# Patient Record
Sex: FEMALE | Race: WHITE | Employment: FULL TIME | URBAN - METROPOLITAN AREA
[De-identification: names, ages, dates, MRNs, and addresses within clinical notes are randomized per-mention and may not be internally consistent; named-entity substitution may affect disease eponyms.]

---

## 2019-04-27 PROCEDURE — 88175 CYTOPATH C/V AUTO FLUID REDO: CPT | Performed by: OBSTETRICS & GYNECOLOGY

## 2019-04-27 PROCEDURE — 87624 HPV HI-RISK TYP POOLED RSLT: CPT | Performed by: OBSTETRICS & GYNECOLOGY

## 2019-06-10 ENCOUNTER — OFFICE VISIT (OUTPATIENT)
Dept: SURGERY | Facility: CLINIC | Age: 64
End: 2019-06-10
Payer: COMMERCIAL

## 2019-06-10 VITALS
TEMPERATURE: 99 F | HEIGHT: 65 IN | WEIGHT: 127 LBS | SYSTOLIC BLOOD PRESSURE: 149 MMHG | RESPIRATION RATE: 16 BRPM | BODY MASS INDEX: 21.16 KG/M2 | DIASTOLIC BLOOD PRESSURE: 80 MMHG | HEART RATE: 111 BPM

## 2019-06-10 DIAGNOSIS — Z12.11 ENCOUNTER FOR SCREENING COLONOSCOPY: Primary | ICD-10-CM

## 2019-06-10 PROCEDURE — S0285 CNSLT BEFORE SCREEN COLONOSC: HCPCS | Performed by: COLON & RECTAL SURGERY

## 2019-06-10 NOTE — H&P
New Patient Visit Note       Active Problems      1. Encounter for screening colonoscopy        Chief Complaint   Patient presents with:  Colonoscopy: New pt ref by BRE Byrne for colonoscopy consult. Last c-scope done 2007.        History of Alejandro 30 minutes. Greater than half of our visit was spent in counseling the patient on the above listed diagnoses and treatment options. Allergies  Jacoby Lui has No Known Allergies.     Past Medical / Surgical / Social / Family History    The past medical an and unexpected weight change. HENT: Negative for hearing loss, nosebleeds, sore throat and trouble swallowing. Respiratory: Negative for apnea, cough, shortness of breath and wheezing.     Cardiovascular: Negative for chest pain, palpitations and leg s no abdominal bruit, no ascites, no pulsatile midline mass and no mass. There is no hepatosplenomegaly, splenomegaly or hepatomegaly. There is no tenderness.  There is no rigidity, no rebound, no guarding, no CVA tenderness, no tenderness at McBurney's point outcomes was fully discussed. The patient seemed to understand the conversation and its details. Consent for surgery was confirmed with the patient.     The patient is currently scheduled for colonoscopy at the [facility] on August 22,

## 2019-06-10 NOTE — PATIENT INSTRUCTIONS
Rajendra Sainz is a 61year old female here in consultation for a colonoscopy for colon cancer screening. The patient was referred to my practice by BRE Lawson. The patient has had a previous colonoscopy. It was last done in 2007.     The krystyna requires a colonoscopy for colon cancer screening. All risks, benefits, complications and alternatives to the proposed operation were fully discussed with the patient. All questions from the patient were answered in detail.  A description of the procedu

## 2019-06-19 PROBLEM — Z12.11 ENCOUNTER FOR SCREENING COLONOSCOPY: Status: ACTIVE | Noted: 2019-06-19

## 2019-06-19 RX ORDER — POLYETHYLENE GLYCOL 3350, SODIUM CHLORIDE, SODIUM BICARBONATE, POTASSIUM CHLORIDE 420; 11.2; 5.72; 1.48 G/4L; G/4L; G/4L; G/4L
POWDER, FOR SOLUTION ORAL
Qty: 1 BOTTLE | Refills: 0 | Status: SHIPPED | OUTPATIENT
Start: 2019-06-19 | End: 2019-08-23 | Stop reason: ALTCHOICE

## 2019-08-22 ENCOUNTER — TELEPHONE (OUTPATIENT)
Dept: SURGERY | Facility: CLINIC | Age: 64
End: 2019-08-22

## 2019-08-22 PROCEDURE — 88305 TISSUE EXAM BY PATHOLOGIST: CPT | Performed by: COLON & RECTAL SURGERY

## 2019-08-23 ENCOUNTER — OFFICE VISIT (OUTPATIENT)
Dept: SURGERY | Facility: CLINIC | Age: 64
End: 2019-08-23
Payer: COMMERCIAL

## 2019-08-23 VITALS
SYSTOLIC BLOOD PRESSURE: 126 MMHG | WEIGHT: 125 LBS | HEIGHT: 66 IN | DIASTOLIC BLOOD PRESSURE: 78 MMHG | BODY MASS INDEX: 20.09 KG/M2 | HEART RATE: 98 BPM

## 2019-08-23 DIAGNOSIS — R51.9 CHRONIC INTRACTABLE HEADACHE, UNSPECIFIED HEADACHE TYPE: Primary | ICD-10-CM

## 2019-08-23 DIAGNOSIS — M50.021 CERVICAL DISC DISORDER AT C4-C5 LEVEL WITH MYELOPATHY: ICD-10-CM

## 2019-08-23 DIAGNOSIS — R29.898 WEAKNESS OF BOTH UPPER EXTREMITIES: ICD-10-CM

## 2019-08-23 DIAGNOSIS — G89.29 CHRONIC INTRACTABLE HEADACHE, UNSPECIFIED HEADACHE TYPE: Primary | ICD-10-CM

## 2019-08-23 DIAGNOSIS — R29.898 WEAKNESS OF BOTH LOWER LIMBS: ICD-10-CM

## 2019-08-23 PROCEDURE — 99203 OFFICE O/P NEW LOW 30 MIN: CPT | Performed by: PHYSICIAN ASSISTANT

## 2019-08-23 NOTE — PROGRESS NOTES
Pt states that for the past couple of weeks she has been having issues with headaches. Pt states that about a week ago she had noticed a bump on the left side of the esdras. Pt denies any falls.

## 2019-08-23 NOTE — PATIENT INSTRUCTIONS
Refill policies:    • Allow 2-3 business days for refills; controlled substances may take longer.   • Contact your pharmacy at least 5 days prior to running out of medication and have them send an electronic request or submit request through the “request re Depending on your insurance carrier, approval may take 3-10 days. It is highly recommended patients contact their insurance carrier directly to determine coverage.   If test is done without insurance authorization, patient may be responsible for the entire cervical spine to evaluate for cervical stenosis. MRI of the brain to evaluate for a Chiari malformation or any other etiology. And to visualize the scalp cyst  3. Call with any changes  4.   Consider biopsy to the left frontal scalp cyst

## 2019-08-23 NOTE — PROGRESS NOTES
Neurosurgery Consultation      REASON FOR CONSULT: Left-sided headache    HISTORY OF PRESENT ILLNESS:Fely Ca is a 61year old female here for evaluation of headaches is been going on for several weeks.   She took Excedrin Migraine which kind of help Outpatient Medications on File Prior to Visit:  Amitriptyline HCl 10 MG Oral Tab Take 10 mg by mouth nightly. Disp:  Rfl:    BIOTIN OR Take by mouth. Disp:  Rfl:    Ergocalciferol (VITAMIN D OR) Take by mouth.  Disp:  Rfl:    Cetirizine HCl (ZYRTEC OR) Take Hamstrings   Quads    D-flexion P-flexion Eversion   Right       4*         5       5         4+* 5 5   Left       4*         5       5         4+* 5 5     Reflexes DTRs:     Biceps   Brachioradialis   Triceps    Patellar    Ankle  Hamstring   Right      2

## 2019-09-04 ENCOUNTER — TELEPHONE (OUTPATIENT)
Dept: SURGERY | Facility: CLINIC | Age: 64
End: 2019-09-04

## 2019-09-05 ENCOUNTER — TELEPHONE (OUTPATIENT)
Dept: SURGERY | Facility: CLINIC | Age: 64
End: 2019-09-05

## 2019-09-05 DIAGNOSIS — M89.9 SKULL LESION: ICD-10-CM

## 2019-09-05 DIAGNOSIS — D75.9 BONE MARROW DISORDER: ICD-10-CM

## 2019-09-05 DIAGNOSIS — M48.02 CERVICAL STENOSIS OF SPINAL CANAL: ICD-10-CM

## 2019-09-05 DIAGNOSIS — G93.9 BRAIN LESION: Primary | ICD-10-CM

## 2019-09-05 RX ORDER — HYDROCODONE BITARTRATE AND ACETAMINOPHEN 10; 325 MG/1; MG/1
1 TABLET ORAL EVERY 4 HOURS PRN
Qty: 60 TABLET | Refills: 0 | Status: SHIPPED | OUTPATIENT
Start: 2019-09-05 | End: 2019-10-18

## 2019-09-05 NOTE — TELEPHONE ENCOUNTER
Report from [facility] choice on MRI of the brain shows a 3.5 x 3 x 2 cm by this expansile lesion in the clivus.   Patient was contacted we will repeat the MRI of the brain with and without contrast    MRI of the cervical spine shows spinal stenosis C3-4 and C5-

## 2019-09-06 ENCOUNTER — OFFICE VISIT (OUTPATIENT)
Dept: SURGERY | Facility: CLINIC | Age: 64
End: 2019-09-06
Payer: COMMERCIAL

## 2019-09-06 VITALS
HEART RATE: 88 BPM | SYSTOLIC BLOOD PRESSURE: 112 MMHG | WEIGHT: 125 LBS | HEIGHT: 66 IN | DIASTOLIC BLOOD PRESSURE: 72 MMHG | BODY MASS INDEX: 20.09 KG/M2

## 2019-09-06 DIAGNOSIS — G89.29 CHRONIC INTRACTABLE HEADACHE, UNSPECIFIED HEADACHE TYPE: ICD-10-CM

## 2019-09-06 DIAGNOSIS — G93.9 BRAIN LESION: Primary | ICD-10-CM

## 2019-09-06 DIAGNOSIS — D75.9 BONE MARROW DISORDER: ICD-10-CM

## 2019-09-06 DIAGNOSIS — R29.898 WEAKNESS OF BOTH UPPER EXTREMITIES: ICD-10-CM

## 2019-09-06 DIAGNOSIS — R51.9 CHRONIC INTRACTABLE HEADACHE, UNSPECIFIED HEADACHE TYPE: ICD-10-CM

## 2019-09-06 DIAGNOSIS — M50.021 CERVICAL DISC DISORDER AT C4-C5 LEVEL WITH MYELOPATHY: ICD-10-CM

## 2019-09-06 DIAGNOSIS — R29.898 WEAKNESS OF BOTH LOWER LIMBS: ICD-10-CM

## 2019-09-06 DIAGNOSIS — M48.02 CERVICAL STENOSIS OF SPINAL CANAL: ICD-10-CM

## 2019-09-06 DIAGNOSIS — M89.9 SKULL LESION: ICD-10-CM

## 2019-09-06 PROCEDURE — 99213 OFFICE O/P EST LOW 20 MIN: CPT | Performed by: PHYSICIAN ASSISTANT

## 2019-09-06 RX ORDER — PREDNISONE 10 MG/1
10 TABLET ORAL DAILY
Qty: 30 TABLET | Refills: 0 | Status: SHIPPED | OUTPATIENT
Start: 2019-09-06 | End: 2019-09-20 | Stop reason: ALTCHOICE

## 2019-09-06 NOTE — TELEPHONE ENCOUNTER
Per BRE Bell: \"Patient also need an appointment next Tuesday for oncology follow-up at 56 or 11 AM with Dr. Williams Reese. \"

## 2019-09-06 NOTE — PROGRESS NOTES
Follow up after imaging 8/31 MRI    X-rays of the cervical spine to rule out anterior spurring, MRI of the cervical spine to evaluate for cervical stenosis. MRI of the brain to evaluate for a Chiari malformation or any other etiology.   And to visualize th

## 2019-09-06 NOTE — PROGRESS NOTES
Neurosurgery   Follow-up      HISTORY OF PRESENT ILLNESS:Fely Chen is a 59year old female here in follow-up. Since her last visit her headache got quite debilitating Kirsten was called in last night to the pharmacy it has helped her headache.   She st • Atypical glandular cell changes cervix of undetermined significance favor dysplasia 02/12/2014    hpv+   • Encounter for screening for human papillomavirus (HPV) 10/08/2014    normal   • HPV in female        PAST SURGICAL HISTORY:  Past Surgical History: NEUROLOGICAL:  This patient is alert and orientated x 3. Speech fluent. Comprehension intact. Pupils equally round and reactive to light. 3+ brisk bilaterally. EOMs intact. Face is symmetrical. Tongue is midline.   Uvula and palate elevate symmetrically T2 hyperintense expansile without lesion involving the clivus 3.5 x 3 x 2 cm that extends into the left cavernous sinus encasing the left cavernous ICA by 180 degrees. Extension is also into the left Meckel's cave involving the left foramen ovale.   Marina Barnhart [phone]

## 2019-09-06 NOTE — PATIENT INSTRUCTIONS
Refill policies:    • Allow 2-3 business days for refills; controlled substances may take longer.   • Contact your pharmacy at least 5 days prior to running out of medication and have them send an electronic request or submit request through the “request re Depending on your insurance carrier, approval may take 3-10 days. It is highly recommended patients contact their insurance carrier directly to determine coverage.   If test is done without insurance authorization, patient may be responsible for the entire of the brain with contrast, MRI of the cervical spine with contrast  4. Prednisone taper for her newly developing left visual changes in the left lip numbness  5. She has Norco for pain  • 6.   If she has any further decline over the weekend she is to com

## 2019-09-09 ENCOUNTER — TELEPHONE (OUTPATIENT)
Dept: SURGERY | Facility: CLINIC | Age: 64
End: 2019-09-09

## 2019-09-09 NOTE — TELEPHONE ENCOUNTER
[facility] called, Dr. Bora Gomes. MRI shows several bony lesions, rib lesions, spine, left femur mass. Dr. Bora Gomes suspects patient may have multiple myeloma or Lymphoma.   I asked Dr. Bora Gomes if we may have a copy of the report, she stated she will

## 2019-09-09 NOTE — TELEPHONE ENCOUNTER
Patient notified that colon polyp(s) were benign. 6 month Colonoscopy pt outreach and recall entered due to poor prep. Patient verbalized understanding.

## 2019-09-10 ENCOUNTER — LAB ENCOUNTER (OUTPATIENT)
Dept: LAB | Facility: HOSPITAL | Age: 64
End: 2019-09-10
Attending: PHYSICIAN ASSISTANT
Payer: COMMERCIAL

## 2019-09-10 ENCOUNTER — NURSE ONLY (OUTPATIENT)
Dept: HEMATOLOGY/ONCOLOGY | Facility: HOSPITAL | Age: 64
End: 2019-09-10
Attending: NEUROLOGICAL SURGERY
Payer: COMMERCIAL

## 2019-09-10 ENCOUNTER — HOSPITAL ENCOUNTER (OUTPATIENT)
Dept: GENERAL RADIOLOGY | Facility: HOSPITAL | Age: 64
Discharge: HOME | End: 2019-09-10
Attending: PHYSICIAN ASSISTANT
Payer: COMMERCIAL

## 2019-09-10 ENCOUNTER — OFFICE VISIT (OUTPATIENT)
Dept: NEUROLOGY | Facility: CLINIC | Age: 64
End: 2019-09-10
Payer: COMMERCIAL

## 2019-09-10 VITALS
TEMPERATURE: 99 F | WEIGHT: 124.38 LBS | SYSTOLIC BLOOD PRESSURE: 159 MMHG | DIASTOLIC BLOOD PRESSURE: 85 MMHG | HEIGHT: 66 IN | HEART RATE: 112 BPM | RESPIRATION RATE: 16 BRPM | OXYGEN SATURATION: 97 % | BODY MASS INDEX: 19.99 KG/M2

## 2019-09-10 DIAGNOSIS — G93.9 BRAIN LESION: ICD-10-CM

## 2019-09-10 DIAGNOSIS — M89.9 SKULL LESION: ICD-10-CM

## 2019-09-10 DIAGNOSIS — H53.9 VISUAL CHANGES: ICD-10-CM

## 2019-09-10 DIAGNOSIS — M89.9 SKULL LESION: Primary | ICD-10-CM

## 2019-09-10 DIAGNOSIS — M89.9 RIB LESION: ICD-10-CM

## 2019-09-10 DIAGNOSIS — M89.9 LYTIC BONE LESION OF FEMUR: ICD-10-CM

## 2019-09-10 DIAGNOSIS — J34.89 LESION OR MASS OF PARANASAL SINUSES: ICD-10-CM

## 2019-09-10 LAB
ALBUMIN SERPL-MCNC: 3.5 G/DL (ref 3.4–5)
ALBUMIN/GLOB SERPL: 0.5 {RATIO} (ref 1–2)
ALP LIVER SERPL-CCNC: 89 U/L (ref 50–130)
ALT SERPL-CCNC: 22 U/L (ref 13–56)
ANION GAP SERPL CALC-SCNC: 5 MMOL/L (ref 0–18)
AST SERPL-CCNC: 16 U/L (ref 15–37)
BASOPHILS # BLD AUTO: 0.01 X10(3) UL (ref 0–0.2)
BASOPHILS NFR BLD AUTO: 0.1 %
BILIRUB SERPL-MCNC: 0.4 MG/DL (ref 0.1–2)
BUN BLD-MCNC: 17 MG/DL (ref 7–18)
BUN/CREAT SERPL: 27.9 (ref 10–20)
CALCIUM BLD-MCNC: 11 MG/DL (ref 8.5–10.1)
CHLORIDE SERPL-SCNC: 103 MMOL/L (ref 98–112)
CO2 SERPL-SCNC: 28 MMOL/L (ref 21–32)
CREAT BLD-MCNC: 0.61 MG/DL (ref 0.55–1.02)
DEPRECATED RDW RBC AUTO: 46 FL (ref 35.1–46.3)
EOSINOPHIL # BLD AUTO: 0 X10(3) UL (ref 0–0.7)
EOSINOPHIL NFR BLD AUTO: 0 %
ERYTHROCYTE [DISTWIDTH] IN BLOOD BY AUTOMATED COUNT: 14.2 % (ref 11–15)
GLOBULIN PLAS-MCNC: 7 G/DL (ref 2.8–4.4)
GLUCOSE BLD-MCNC: 100 MG/DL (ref 70–99)
HCT VFR BLD AUTO: 34.9 % (ref 35–48)
HGB BLD-MCNC: 11.7 G/DL (ref 12–16)
IMM GRANULOCYTES # BLD AUTO: 0.05 X10(3) UL (ref 0–1)
IMM GRANULOCYTES NFR BLD: 0.4 %
LYMPHOCYTES # BLD AUTO: 1.68 X10(3) UL (ref 1–4)
LYMPHOCYTES NFR BLD AUTO: 14.5 %
M PROTEIN MFR SERPL ELPH: 10.5 G/DL (ref 6.4–8.2)
MCH RBC QN AUTO: 29.9 PG (ref 26–34)
MCHC RBC AUTO-ENTMCNC: 33.5 G/DL (ref 31–37)
MCV RBC AUTO: 89.3 FL (ref 80–100)
MONOCYTES # BLD AUTO: 0.69 X10(3) UL (ref 0.1–1)
MONOCYTES NFR BLD AUTO: 6 %
NEUTROPHILS # BLD AUTO: 9.12 X10 (3) UL (ref 1.5–7.7)
NEUTROPHILS # BLD AUTO: 9.12 X10(3) UL (ref 1.5–7.7)
NEUTROPHILS NFR BLD AUTO: 79 %
OSMOLALITY SERPL CALC.SUM OF ELEC: 284 MOSM/KG (ref 275–295)
PLATELET # BLD AUTO: 428 10(3)UL (ref 150–450)
POTASSIUM SERPL-SCNC: 4.3 MMOL/L (ref 3.5–5.1)
RBC # BLD AUTO: 3.91 X10(6)UL (ref 3.8–5.3)
SODIUM SERPL-SCNC: 136 MMOL/L (ref 136–145)
WBC # BLD AUTO: 11.6 X10(3) UL (ref 4–11)

## 2019-09-10 PROCEDURE — 73502 X-RAY EXAM HIP UNI 2-3 VIEWS: CPT | Performed by: PHYSICIAN ASSISTANT

## 2019-09-10 PROCEDURE — 86334 IMMUNOFIX E-PHORESIS SERUM: CPT

## 2019-09-10 PROCEDURE — 84165 PROTEIN E-PHORESIS SERUM: CPT

## 2019-09-10 PROCEDURE — 99213 OFFICE O/P EST LOW 20 MIN: CPT | Performed by: PHYSICIAN ASSISTANT

## 2019-09-10 PROCEDURE — 80053 COMPREHEN METABOLIC PANEL: CPT

## 2019-09-10 PROCEDURE — 36415 COLL VENOUS BLD VENIPUNCTURE: CPT

## 2019-09-10 PROCEDURE — 99211 OFF/OP EST MAY X REQ PHY/QHP: CPT

## 2019-09-10 PROCEDURE — 83883 ASSAY NEPHELOMETRY NOT SPEC: CPT

## 2019-09-10 PROCEDURE — 85025 COMPLETE CBC W/AUTO DIFF WBC: CPT

## 2019-09-10 NOTE — PROGRESS NOTES
Neurosurgery   Follow-up        HISTORY OF PRESENT Marilee Rosen is a 59year old female here in follow-up after CT CAP. Has had Norco for pain. Prednisone taper. Left vision better with steroids. Left hip pain is worse.     Oncology conference re here for evaluation of headaches is been going on for several weeks. She took Excedrin Migraine which kind of help. She states the headache is more over the left temporal and frontal area.   It is tender along the scalp to touch she has a small bump she d REVIEW OF SYSTEMS:  A 10-point system was reviewed. Pertinent positives and negatives are noted in HPI.       PHYSICAL EXAMINATION: Today's exam  GENERAL:  Patient is in no acute distress. HEENT:  Normocephalic. Possible left nasal VF defect.       She re      Deltoid    Triceps     Biceps        Wrist Extension  Finger extension Thumb Abduction  Thumb adduction Intrinsics   Right         4*        5         4+*          5 5 4+* 4 5 4   Left         4*        5         4+*          5 5 4+* 4 5 4      Lo 4.  Weakness in the upper and lower extremities  5. Dysphagia  6. Hypotonia/voice fatigue  7. Multiple lesions of bilateral ribs, right scapulae, sternum, vertebral bodies, T8 lesion, liver, left femur. 8.  New-onset headache  9.   Increased uptake and

## 2019-09-10 NOTE — PATIENT INSTRUCTIONS
PLAN:  1. Follow up Oncology Dr Chandrakant White  Next week  2. CBC, CMP, Monoclonal protein studies. 3.  MRI of the brain with contrast, MRI of the cervical spine with contrast  4.   IR Biopsy left femur or other lesion  5.  consider left hip xr and ortho

## 2019-09-12 ENCOUNTER — ORDER TRANSCRIPTION (OUTPATIENT)
Dept: ADMINISTRATIVE | Facility: HOSPITAL | Age: 64
End: 2019-09-12

## 2019-09-12 DIAGNOSIS — G93.9 BRAIN LESION: ICD-10-CM

## 2019-09-12 DIAGNOSIS — M89.9 RIB LESION: ICD-10-CM

## 2019-09-12 DIAGNOSIS — J34.89 LESION OR MASS OF PARANASAL SINUSES: ICD-10-CM

## 2019-09-12 DIAGNOSIS — M89.9 SKULL LESION: Primary | ICD-10-CM

## 2019-09-12 DIAGNOSIS — M89.9 LYTIC BONE LESION OF FEMUR: ICD-10-CM

## 2019-09-13 ENCOUNTER — HOSPITAL ENCOUNTER (INPATIENT)
Facility: HOSPITAL | Age: 64
LOS: 5 days | Discharge: HOME | DRG: 824 | End: 2019-09-18
Attending: ORTHOPAEDIC SURGERY | Admitting: ORTHOPAEDIC SURGERY
Payer: COMMERCIAL

## 2019-09-13 DIAGNOSIS — M89.9 LYTIC BONE LESION OF FEMUR: Primary | ICD-10-CM

## 2019-09-13 DIAGNOSIS — C90.00 MULTIPLE MYELOMA NOT HAVING ACHIEVED REMISSION (HCC): ICD-10-CM

## 2019-09-13 PROCEDURE — 99223 1ST HOSP IP/OBS HIGH 75: CPT | Performed by: HOSPITALIST

## 2019-09-13 PROCEDURE — 99254 IP/OBS CNSLTJ NEW/EST MOD 60: CPT | Performed by: INTERNAL MEDICINE

## 2019-09-13 RX ORDER — MORPHINE SULFATE 2 MG/ML
2 INJECTION, SOLUTION INTRAMUSCULAR; INTRAVENOUS EVERY 2 HOUR PRN
Status: DISCONTINUED | OUTPATIENT
Start: 2019-09-13 | End: 2019-09-18

## 2019-09-13 RX ORDER — PREDNISONE 10 MG/1
10 TABLET ORAL 2 TIMES DAILY
Status: COMPLETED | OUTPATIENT
Start: 2019-09-13 | End: 2019-09-16

## 2019-09-13 RX ORDER — CETIRIZINE HYDROCHLORIDE 10 MG/1
10 TABLET ORAL DAILY
COMMUNITY

## 2019-09-13 RX ORDER — MORPHINE SULFATE 4 MG/ML
4 INJECTION, SOLUTION INTRAMUSCULAR; INTRAVENOUS EVERY 2 HOUR PRN
Status: DISCONTINUED | OUTPATIENT
Start: 2019-09-13 | End: 2019-09-18

## 2019-09-13 RX ORDER — DOCUSATE SODIUM 100 MG/1
100 CAPSULE, LIQUID FILLED ORAL 2 TIMES DAILY PRN
Status: DISCONTINUED | OUTPATIENT
Start: 2019-09-13 | End: 2019-09-18

## 2019-09-13 RX ORDER — HYDROCODONE BITARTRATE AND ACETAMINOPHEN 5; 325 MG/1; MG/1
1 TABLET ORAL EVERY 6 HOURS PRN
Status: DISCONTINUED | OUTPATIENT
Start: 2019-09-13 | End: 2019-09-18

## 2019-09-13 RX ORDER — ESTRADIOL AND NORETHINDRONE ACETATE 1; .5 MG/1; MG/1
1 TABLET ORAL DAILY
Status: DISCONTINUED | OUTPATIENT
Start: 2019-09-13 | End: 2019-09-14 | Stop reason: RX

## 2019-09-13 RX ORDER — HYDROCODONE BITARTRATE AND ACETAMINOPHEN 10; 325 MG/1; MG/1
1 TABLET ORAL EVERY 4 HOURS PRN
Status: DISCONTINUED | OUTPATIENT
Start: 2019-09-13 | End: 2019-09-18

## 2019-09-13 RX ORDER — SODIUM CHLORIDE 0.9 % (FLUSH) 0.9 %
3 SYRINGE (ML) INJECTION AS NEEDED
Status: DISCONTINUED | OUTPATIENT
Start: 2019-09-13 | End: 2019-09-18

## 2019-09-13 RX ORDER — CETIRIZINE HYDROCHLORIDE 10 MG/1
10 TABLET ORAL DAILY
Status: DISCONTINUED | OUTPATIENT
Start: 2019-09-13 | End: 2019-09-18

## 2019-09-13 RX ORDER — ACETAMINOPHEN 325 MG/1
650 TABLET ORAL EVERY 6 HOURS PRN
Status: DISCONTINUED | OUTPATIENT
Start: 2019-09-13 | End: 2019-09-18

## 2019-09-13 RX ORDER — ONDANSETRON 2 MG/ML
4 INJECTION INTRAMUSCULAR; INTRAVENOUS EVERY 6 HOURS PRN
Status: DISCONTINUED | OUTPATIENT
Start: 2019-09-13 | End: 2019-09-18

## 2019-09-13 RX ORDER — MORPHINE SULFATE 2 MG/ML
1 INJECTION, SOLUTION INTRAMUSCULAR; INTRAVENOUS EVERY 2 HOUR PRN
Status: DISCONTINUED | OUTPATIENT
Start: 2019-09-13 | End: 2019-09-18

## 2019-09-13 NOTE — H&P
[facility]    PATIENT'S NAME: Yonathan Ramirez   ATTENDING PHYSICIAN: Lisa Parker MD   PATIENT ACCOUNT#:   [id number]    LOCATION:  [address] [place] [address] #:   [id number]       YOB: 1955  ADMISSION DATE:       09/13/2019 Anterior extension into the sphenoid sinus. There are numerous calvarial lesions, the largest being left frontal calvarium measuring 2 x 1.3 cm. Patient had blood tests done, but it is not clear if she had actual plasma protein electrophoresis.   Also, pa Positive bowel sounds. EXTREMITIES:  No peripheral edema, clubbing, or cyanosis. NEUROLOGIC:  Motor and sensory intact. Cranial nerves II through XII are intact. ASSESSMENT:    1.    Clinical presentation and manifestations consistent with lytic l

## 2019-09-14 LAB
ANION GAP SERPL CALC-SCNC: 7 MMOL/L (ref 0–18)
APTT PPP: 22.5 SECONDS (ref 23.2–35.3)
BASOPHILS # BLD AUTO: 0.04 X10(3) UL (ref 0–0.2)
BASOPHILS NFR BLD AUTO: 0.3 %
BUN BLD-MCNC: 16 MG/DL (ref 7–18)
BUN/CREAT SERPL: 22.5 (ref 10–20)
CALCIUM BLD-MCNC: 10.8 MG/DL (ref 8.5–10.1)
CHLORIDE SERPL-SCNC: 102 MMOL/L (ref 98–112)
CO2 SERPL-SCNC: 29 MMOL/L (ref 21–32)
CREAT BLD-MCNC: 0.71 MG/DL (ref 0.55–1.02)
DEPRECATED RDW RBC AUTO: 46.6 FL (ref 35.1–46.3)
EOSINOPHIL # BLD AUTO: 0.01 X10(3) UL (ref 0–0.7)
EOSINOPHIL NFR BLD AUTO: 0.1 %
ERYTHROCYTE [DISTWIDTH] IN BLOOD BY AUTOMATED COUNT: 14.5 % (ref 11–15)
GLUCOSE BLD-MCNC: 97 MG/DL (ref 70–99)
HCT VFR BLD AUTO: 37.3 % (ref 35–48)
HGB BLD-MCNC: 12.2 G/DL (ref 12–16)
IMM GRANULOCYTES # BLD AUTO: 0.12 X10(3) UL (ref 0–1)
IMM GRANULOCYTES NFR BLD: 0.9 %
INR BLD: 1.08 (ref 0.9–1.2)
LYMPHOCYTES # BLD AUTO: 2.29 X10(3) UL (ref 1–4)
LYMPHOCYTES NFR BLD AUTO: 16.9 %
MCH RBC QN AUTO: 29.5 PG (ref 26–34)
MCHC RBC AUTO-ENTMCNC: 32.7 G/DL (ref 31–37)
MCV RBC AUTO: 90.3 FL (ref 80–100)
MONOCYTES # BLD AUTO: 1.23 X10(3) UL (ref 0.1–1)
MONOCYTES NFR BLD AUTO: 9.1 %
NEUTROPHILS # BLD AUTO: 9.84 X10 (3) UL (ref 1.5–7.7)
NEUTROPHILS # BLD AUTO: 9.84 X10(3) UL (ref 1.5–7.7)
NEUTROPHILS NFR BLD AUTO: 72.7 %
OSMOLALITY SERPL CALC.SUM OF ELEC: 287 MOSM/KG (ref 275–295)
PLATELET # BLD AUTO: 449 10(3)UL (ref 150–450)
POTASSIUM SERPL-SCNC: 4.4 MMOL/L (ref 3.5–5.1)
PROTHROMBIN TIME: 13.8 SECONDS (ref 11.8–14.5)
RBC # BLD AUTO: 4.13 X10(6)UL (ref 3.8–5.3)
SODIUM SERPL-SCNC: 138 MMOL/L (ref 136–145)
WBC # BLD AUTO: 13.5 X10(3) UL (ref 4–11)

## 2019-09-14 PROCEDURE — 99233 SBSQ HOSP IP/OBS HIGH 50: CPT | Performed by: HOSPITALIST

## 2019-09-14 NOTE — CONSULTS
[facility]    Report of Consultation    Josue Lynne Patient Status:  Inpatient    1955 MRN [id number]   Location [facility] 4W/SW/SE Attending Patricia Moncada MD   Hosp Day # 0 PCP No primary care provider on file.      D HISTORY  1982    nasal surgery   • OTHER SURGICAL HISTORY  03/28/2014    endo. bx     History reviewed. No pertinent family history.   Social History:  Social History    Tobacco Use      Smoking status: Never Smoker      Smokeless tobacco: Never Used    Alc Psychiatric/Behavioral: Negative. Physical Exam:   Vital Signs:   height is 1.651 m (5' 5\") and weight is 55.1 kg (121 lb 6.4 oz). Her oral temperature is 97.6 °F (36.4 °C). Her blood pressure is 136/85 and her pulse is 100.  Her respiration is 18 the clivus 3.5 x 3 x 2 cm that extends into the left cavernous sinus encasing the left cavernous ICA by 180 degrees.  Extension is also into the left Meckel's cave involving the left foramen ovale.  Inferior lesion extends through the pterygomaxillary fiss

## 2019-09-14 NOTE — PLAN OF CARE
Problem: Patient Centered Care  Goal: Patient preferences are identified and integrated in the patient's plan of care  Description  Interventions:  - What would you like us to know as we care for you?  To keep me and my family controlled  - Provide timely - ADULT  Goal: Absence of fever/infection during anticipated neutropenic period  Description  INTERVENTIONS  - Monitor WBC  - Administer growth factors as ordered  - Implement neutropenic guidelines  Outcome: Progressing     Problem: SAFETY ADULT - FALL  G tube to low intermittent suction as ordered  - Evaluate effectiveness of ordered antiemetic medications  - Provide nonpharmacologic comfort measures as appropriate  - Advance diet as tolerated, if ordered  - Obtain nutritional consult as needed  - Evaluate hematology. New iv has been placed.  Patient to stay in the hospital.

## 2019-09-14 NOTE — PROGRESS NOTES
[facility]    Progress Note    Vincenzo Lewis Patient Status:  Inpatient    1955 MRN [id number]   Location [facility] 4W/SW/SE Attending Jocelyn Chaney MD   Hosp Day # 1 PCP No primary care provider on file.        Subjecti Willebrand Disease Type I  -plan von Willebrand factor activity and antigen testing today  -plan for DDAVP vs. Humate P prior to surgery    Bone Lesions  -Possible Multiple Myeloma vs. Lymphoma- with multiple lesions of b/l ribs, rt.  Scapulae, sternum, conner

## 2019-09-15 LAB
BILIRUB UR QL: NEGATIVE
COLOR UR: YELLOW
GLUCOSE UR-MCNC: NEGATIVE MG/DL
KETONES UR-MCNC: NEGATIVE MG/DL
LEUKOCYTE ESTERASE UR QL STRIP.AUTO: NEGATIVE
NITRITE UR QL STRIP.AUTO: NEGATIVE
PH UR: 7 [PH] (ref 5–8)
PROT UR-MCNC: NEGATIVE MG/DL
RBC #/AREA URNS AUTO: 3 /HPF
SP GR UR STRIP: 1.01 (ref 1–1.03)
UROBILINOGEN UR STRIP-ACNC: <2
VIT C UR-MCNC: NEGATIVE MG/DL
WBC #/AREA URNS AUTO: <1 /HPF

## 2019-09-15 PROCEDURE — 99232 SBSQ HOSP IP/OBS MODERATE 35: CPT | Performed by: HOSPITALIST

## 2019-09-15 PROCEDURE — 99232 SBSQ HOSP IP/OBS MODERATE 35: CPT | Performed by: INTERNAL MEDICINE

## 2019-09-15 RX ORDER — SODIUM CHLORIDE 9 MG/ML
INJECTION, SOLUTION INTRAVENOUS CONTINUOUS
Status: DISCONTINUED | OUTPATIENT
Start: 2019-09-16 | End: 2019-09-18

## 2019-09-15 NOTE — PLAN OF CARE
Problem: Patient Centered Care  Goal: Patient preferences are identified and integrated in the patient's plan of care  Description  Interventions:  - What would you like us to know as we care for you?   - Provide timely, complete, and accurate informatio for assistance with activity based on assessment  - Modify environment to reduce risk of injury  - Provide assistive devices as appropriate  - Consider OT/PT consult to assist with strengthening/mobility  - Encourage toileting schedule  Outcome: Progressin needed  - Establish a toileting routine/schedule  - Consider collaborating with pharmacy to review patient's medication profile  Outcome: Progressing  Goal: Maintains adequate nutritional intake (undernourished)  Description  INTERVENTIONS:  - Monitor perc

## 2019-09-15 NOTE — PROGRESS NOTES
[facility]    Progress Note    Mesfin Restrepo Patient Status:  Inpatient    1955 MRN [id number]   Location [facility] 4W/SW/SE Attending Jovanni Rdz MD   Hosp Day # 2 PCP No primary care provider on file.      [facility] Neck: Normal range of motion. Neck supple. Cardiovascular: Normal rate and regular rhythm. Pulmonary/Chest: Effort normal and breath sounds normal. No respiratory distress. Abdominal: Soft. Bowel sounds are normal. She exhibits no distension.  Ther Chavez Andersen MD  9/15/2019

## 2019-09-15 NOTE — PROGRESS NOTES
[facility]    Progress Note    Diego Hampton Patient Status:  Inpatient    1955 MRN [id number]   Location [facility] 4W/SW/SE Attending Judi Varma MD   Hosp Day # 2 PCP No primary care provider on file.        Subjecti Type I  -plan von Willebrand factor activity and antigen testing today  -plan for DDAVP vs. Humate P prior to surgery    Bone Lesions  -Possible Multiple Myeloma vs. Lymphoma- with multiple lesions of b/l ribs, rt.  Scapulae, sternum, vertebral bodies, T8 l

## 2019-09-15 NOTE — PLAN OF CARE
Problem: Patient Centered Care  Goal: Patient preferences are identified and integrated in the patient's plan of care  Description  Interventions:  - What would you like us to know as we care for you?  To keep me and my family updated   - Provide timely, Absence of fever/infection during anticipated neutropenic period  Description  INTERVENTIONS  - Monitor WBC  - Administer growth factors as ordered  - Implement neutropenic guidelines  Outcome: Progressing     Problem: SAFETY ADULT - FALL  Goal: Free from intermittent suction as ordered  - Evaluate effectiveness of ordered antiemetic medications  - Provide nonpharmacologic comfort measures as appropriate  - Advance diet as tolerated, if ordered  - Obtain nutritional consult as needed  - Evaluate fluid dale IVF. Per Chun Tenorio, further orders will be followed tomorrow Am. Patient to have surgery tomorrow.

## 2019-09-16 ENCOUNTER — ANESTHESIA EVENT (OUTPATIENT)
Dept: SURGERY | Facility: HOSPITAL | Age: 64
DRG: 824 | End: 2019-09-16
Payer: COMMERCIAL

## 2019-09-16 ENCOUNTER — APPOINTMENT (OUTPATIENT)
Dept: GENERAL RADIOLOGY | Facility: HOSPITAL | Age: 64
DRG: 824 | End: 2019-09-16
Attending: ORTHOPAEDIC SURGERY
Payer: COMMERCIAL

## 2019-09-16 ENCOUNTER — TELEPHONE (OUTPATIENT)
Dept: SURGERY | Facility: CLINIC | Age: 64
End: 2019-09-16

## 2019-09-16 ENCOUNTER — ANESTHESIA (OUTPATIENT)
Dept: SURGERY | Facility: HOSPITAL | Age: 64
DRG: 824 | End: 2019-09-16
Payer: COMMERCIAL

## 2019-09-16 LAB
ANION GAP SERPL CALC-SCNC: 9 MMOL/L (ref 0–18)
ANTIBODY SCREEN: NEGATIVE
BUN BLD-MCNC: 16 MG/DL (ref 7–18)
BUN/CREAT SERPL: 20.5 (ref 10–20)
CALCIUM BLD-MCNC: 10.1 MG/DL (ref 8.5–10.1)
CHLORIDE SERPL-SCNC: 100 MMOL/L (ref 98–112)
CO2 SERPL-SCNC: 25 MMOL/L (ref 21–32)
CREAT BLD-MCNC: 0.78 MG/DL (ref 0.55–1.02)
FACT VIII ACT/NOR PPP: >400 % (ref 45–191)
GLUCOSE BLD-MCNC: 110 MG/DL (ref 70–99)
HAV IGM SER QL: 2.1 MG/DL (ref 1.6–2.6)
OSMOLALITY SERPL CALC.SUM OF ELEC: 280 MOSM/KG (ref 275–295)
PHOSPHATE SERPL-MCNC: 4.4 MG/DL (ref 2.5–4.9)
POTASSIUM SERPL-SCNC: 3.9 MMOL/L (ref 3.5–5.1)
RH BLOOD TYPE: POSITIVE
SODIUM SERPL-SCNC: 134 MMOL/L (ref 136–145)
T4 FREE SERPL-MCNC: 1.7 NG/DL (ref 0.8–1.7)
TSI SER-ACNC: 2.68 MIU/ML (ref 0.36–3.74)

## 2019-09-16 PROCEDURE — 73501 X-RAY EXAM HIP UNI 1 VIEW: CPT | Performed by: ORTHOPAEDIC SURGERY

## 2019-09-16 PROCEDURE — 0QH706Z INSERTION OF INTRAMEDULLARY INTERNAL FIXATION DEVICE INTO LEFT UPPER FEMUR, OPEN APPROACH: ICD-10-PCS | Performed by: ORTHOPAEDIC SURGERY

## 2019-09-16 PROCEDURE — 73521 X-RAY EXAM HIPS BI 2 VIEWS: CPT | Performed by: ORTHOPAEDIC SURGERY

## 2019-09-16 PROCEDURE — 76000 FLUOROSCOPY <1 HR PHYS/QHP: CPT | Performed by: ORTHOPAEDIC SURGERY

## 2019-09-16 PROCEDURE — 99232 SBSQ HOSP IP/OBS MODERATE 35: CPT | Performed by: HOSPITALIST

## 2019-09-16 DEVICE — SCREW BN 5MM 4.3MM 38MM NLEX: Type: IMPLANTABLE DEVICE | Site: FEMUR | Status: FUNCTIONAL

## 2019-09-16 DEVICE — SCREW BN 5MM 8MM 40MM NLEX TI: Type: IMPLANTABLE DEVICE | Site: FEMUR | Status: FUNCTIONAL

## 2019-09-16 RX ORDER — SODIUM CHLORIDE, SODIUM LACTATE, POTASSIUM CHLORIDE, CALCIUM CHLORIDE 600; 310; 30; 20 MG/100ML; MG/100ML; MG/100ML; MG/100ML
INJECTION, SOLUTION INTRAVENOUS CONTINUOUS
Status: DISCONTINUED | OUTPATIENT
Start: 2019-09-16 | End: 2019-09-16 | Stop reason: HOSPADM

## 2019-09-16 RX ORDER — MORPHINE SULFATE 10 MG/ML
6 INJECTION, SOLUTION INTRAMUSCULAR; INTRAVENOUS EVERY 10 MIN PRN
Status: DISCONTINUED | OUTPATIENT
Start: 2019-09-16 | End: 2019-09-16 | Stop reason: HOSPADM

## 2019-09-16 RX ORDER — MORPHINE SULFATE 4 MG/ML
2 INJECTION, SOLUTION INTRAMUSCULAR; INTRAVENOUS EVERY 10 MIN PRN
Status: DISCONTINUED | OUTPATIENT
Start: 2019-09-16 | End: 2019-09-16 | Stop reason: HOSPADM

## 2019-09-16 RX ORDER — CEFAZOLIN SODIUM/WATER 2 G/20 ML
SYRINGE (ML) INTRAVENOUS AS NEEDED
Status: DISCONTINUED | OUTPATIENT
Start: 2019-09-16 | End: 2019-09-16 | Stop reason: SURG

## 2019-09-16 RX ORDER — MORPHINE SULFATE 4 MG/ML
4 INJECTION, SOLUTION INTRAMUSCULAR; INTRAVENOUS EVERY 10 MIN PRN
Status: DISCONTINUED | OUTPATIENT
Start: 2019-09-16 | End: 2019-09-16 | Stop reason: HOSPADM

## 2019-09-16 RX ORDER — ACETAMINOPHEN 500 MG
1000 TABLET ORAL ONCE
Status: COMPLETED | OUTPATIENT
Start: 2019-09-16 | End: 2019-09-16

## 2019-09-16 RX ORDER — NALOXONE HYDROCHLORIDE 0.4 MG/ML
80 INJECTION, SOLUTION INTRAMUSCULAR; INTRAVENOUS; SUBCUTANEOUS AS NEEDED
Status: DISCONTINUED | OUTPATIENT
Start: 2019-09-16 | End: 2019-09-16 | Stop reason: HOSPADM

## 2019-09-16 RX ORDER — HYDROCODONE BITARTRATE AND ACETAMINOPHEN 5; 325 MG/1; MG/1
2 TABLET ORAL AS NEEDED
Status: DISCONTINUED | OUTPATIENT
Start: 2019-09-16 | End: 2019-09-16 | Stop reason: HOSPADM

## 2019-09-16 RX ORDER — HYDROCODONE BITARTRATE AND ACETAMINOPHEN 5; 325 MG/1; MG/1
1 TABLET ORAL AS NEEDED
Status: DISCONTINUED | OUTPATIENT
Start: 2019-09-16 | End: 2019-09-16 | Stop reason: HOSPADM

## 2019-09-16 RX ORDER — LIDOCAINE HYDROCHLORIDE 10 MG/ML
INJECTION, SOLUTION EPIDURAL; INFILTRATION; INTRACAUDAL; PERINEURAL AS NEEDED
Status: DISCONTINUED | OUTPATIENT
Start: 2019-09-16 | End: 2019-09-16 | Stop reason: SURG

## 2019-09-16 RX ORDER — EPHEDRINE SULFATE 50 MG/ML
INJECTION, SOLUTION INTRAVENOUS AS NEEDED
Status: DISCONTINUED | OUTPATIENT
Start: 2019-09-16 | End: 2019-09-16 | Stop reason: SURG

## 2019-09-16 RX ORDER — ONDANSETRON 2 MG/ML
INJECTION INTRAMUSCULAR; INTRAVENOUS AS NEEDED
Status: DISCONTINUED | OUTPATIENT
Start: 2019-09-16 | End: 2019-09-16 | Stop reason: SURG

## 2019-09-16 RX ORDER — SCOLOPAMINE TRANSDERMAL SYSTEM 1 MG/1
1 PATCH, EXTENDED RELEASE TRANSDERMAL ONCE
Status: DISCONTINUED | OUTPATIENT
Start: 2019-09-16 | End: 2019-09-18

## 2019-09-16 RX ORDER — DEXAMETHASONE SODIUM PHOSPHATE 4 MG/ML
VIAL (ML) INJECTION AS NEEDED
Status: DISCONTINUED | OUTPATIENT
Start: 2019-09-16 | End: 2019-09-16 | Stop reason: SURG

## 2019-09-16 RX ORDER — SODIUM CHLORIDE, SODIUM LACTATE, POTASSIUM CHLORIDE, CALCIUM CHLORIDE 600; 310; 30; 20 MG/100ML; MG/100ML; MG/100ML; MG/100ML
INJECTION, SOLUTION INTRAVENOUS CONTINUOUS PRN
Status: DISCONTINUED | OUTPATIENT
Start: 2019-09-16 | End: 2019-09-16 | Stop reason: SURG

## 2019-09-16 RX ORDER — PROCHLORPERAZINE EDISYLATE 5 MG/ML
5 INJECTION INTRAMUSCULAR; INTRAVENOUS ONCE AS NEEDED
Status: DISCONTINUED | OUTPATIENT
Start: 2019-09-16 | End: 2019-09-16 | Stop reason: HOSPADM

## 2019-09-16 RX ORDER — GABAPENTIN 600 MG/1
600 TABLET ORAL ONCE
Status: COMPLETED | OUTPATIENT
Start: 2019-09-16 | End: 2019-09-16

## 2019-09-16 RX ORDER — ONDANSETRON 2 MG/ML
4 INJECTION INTRAMUSCULAR; INTRAVENOUS ONCE AS NEEDED
Status: DISCONTINUED | OUTPATIENT
Start: 2019-09-16 | End: 2019-09-16 | Stop reason: HOSPADM

## 2019-09-16 RX ORDER — HYDROMORPHONE HYDROCHLORIDE 1 MG/ML
0.4 INJECTION, SOLUTION INTRAMUSCULAR; INTRAVENOUS; SUBCUTANEOUS EVERY 5 MIN PRN
Status: DISCONTINUED | OUTPATIENT
Start: 2019-09-16 | End: 2019-09-16 | Stop reason: HOSPADM

## 2019-09-16 RX ORDER — HYDROMORPHONE HYDROCHLORIDE 1 MG/ML
0.2 INJECTION, SOLUTION INTRAMUSCULAR; INTRAVENOUS; SUBCUTANEOUS EVERY 5 MIN PRN
Status: DISCONTINUED | OUTPATIENT
Start: 2019-09-16 | End: 2019-09-16 | Stop reason: HOSPADM

## 2019-09-16 RX ORDER — TRANEXAMIC ACID 650 1/1
1950 TABLET ORAL ONCE
Status: DISCONTINUED | OUTPATIENT
Start: 2019-09-16 | End: 2019-09-16 | Stop reason: HOSPADM

## 2019-09-16 RX ORDER — LIDOCAINE HYDROCHLORIDE 40 MG/ML
SOLUTION TOPICAL AS NEEDED
Status: DISCONTINUED | OUTPATIENT
Start: 2019-09-16 | End: 2019-09-16 | Stop reason: SURG

## 2019-09-16 RX ORDER — HALOPERIDOL 5 MG/ML
0.25 INJECTION INTRAMUSCULAR ONCE AS NEEDED
Status: DISCONTINUED | OUTPATIENT
Start: 2019-09-16 | End: 2019-09-16 | Stop reason: HOSPADM

## 2019-09-16 RX ORDER — HYDROMORPHONE HYDROCHLORIDE 1 MG/ML
0.6 INJECTION, SOLUTION INTRAMUSCULAR; INTRAVENOUS; SUBCUTANEOUS EVERY 5 MIN PRN
Status: DISCONTINUED | OUTPATIENT
Start: 2019-09-16 | End: 2019-09-16 | Stop reason: HOSPADM

## 2019-09-16 RX ADMIN — DEXAMETHASONE SODIUM PHOSPHATE 4 MG: 4 MG/ML VIAL (ML) INJECTION at 18:56:00

## 2019-09-16 RX ADMIN — LIDOCAINE HYDROCHLORIDE 4 ML: 40 SOLUTION TOPICAL at 17:33:00

## 2019-09-16 RX ADMIN — ONDANSETRON 4 MG: 2 INJECTION INTRAMUSCULAR; INTRAVENOUS at 19:41:00

## 2019-09-16 RX ADMIN — LIDOCAINE HYDROCHLORIDE 25 MG: 10 INJECTION, SOLUTION EPIDURAL; INFILTRATION; INTRACAUDAL; PERINEURAL at 17:32:00

## 2019-09-16 RX ADMIN — SODIUM CHLORIDE, SODIUM LACTATE, POTASSIUM CHLORIDE, CALCIUM CHLORIDE: 600; 310; 30; 20 INJECTION, SOLUTION INTRAVENOUS at 19:32:00

## 2019-09-16 RX ADMIN — SODIUM CHLORIDE, SODIUM LACTATE, POTASSIUM CHLORIDE, CALCIUM CHLORIDE: 600; 310; 30; 20 INJECTION, SOLUTION INTRAVENOUS at 17:50:00

## 2019-09-16 RX ADMIN — EPHEDRINE SULFATE 10 MG: 50 INJECTION, SOLUTION INTRAVENOUS at 17:47:00

## 2019-09-16 RX ADMIN — CEFAZOLIN SODIUM/WATER 2 G: 2 G/20 ML SYRINGE (ML) INTRAVENOUS at 17:46:00

## 2019-09-16 NOTE — INTERVAL H&P NOTE
Pre-op Diagnosis: left hip lesion    The above referenced H&P was reviewed by Cherry Jimenez MD on 9/16/2019, the patient was examined and no significant changes have occurred in the patient's condition since the H&P was performed.   I discussed with the patie

## 2019-09-16 NOTE — PROGRESS NOTES
[facility]    Progress Note    Matilde Fix Patient Status:  Inpatient    1955 MRN [id number]   Location [facility] 4W/SW/SE Attending Sandra Ross MD   Hosp Day # 3 PCP No primary care provider on file.        Subjecti AST 16 09/10/2019    ALT 22 09/10/2019    PTT 22.5 (L) 09/14/2019    INR 1.08 09/14/2019                   Assessment and Plan:       Lytic bone lesion of femur  -high risk for pathological fracture  -plan prophylactic pinning  -has von-wilebrand bleeding

## 2019-09-16 NOTE — PAYOR COMM NOTE
--------------  ADMISSION REVIEW     Payor: [facility]  Subscriber #:  [id number]  Authorization Number: [id number]    Admit date: 9/13/19  Admit time: [address] [facility]       Admitting Physician: Dayton Thibodeaux MD  Attending Physician:  Emily Weathers MD  Primary Care and pelvis, was found to have lytic lesions of her bones, including the calvarium and left femur, highly suggestive of multiple myeloma.   From what I can review from Dr. Emily Foster notes, patient's MRIs showed lesion on the clivus around 3.5 x 3 cm ex VITAL SIGNS:  Noted. HEENT:  Atraumatic. Oropharynx clear. Dry mucous membranes. Normal hard and soft palate. Eyes:  Anicteric sclerae. Pupils equal, round, reactive to light and accommodation. Extraocular muscle movements intact.   Ears, nose norm diagnosed initially in 1982 when she had sinus surgery. Patient has been treated with DDAVP previously prior to plastic surgery in 2008. Patient is to have rodding of her left femur on 9/16/2019 per Dr. Matias Varela.   She was found to have a large lytic l    Medications Prior to Admission:  cetirizine 10 MG Oral Tab Take 10 mg by mouth daily. predniSONE 10 MG Oral Tab Take 1 tablet (10 mg total) by mouth daily.  40 mg per day x 4 days, 30 mg per day x 3 days, 20 mg per day x 2 days, 10 mg x 1 day   HYDRO and well-nourished. HENT:   Mouth/Throat: No oropharyngeal exudate. Bony protrusion left upper forehead approximately 2 cm   Eyes: Pupils are equal, round, and reactive to light. Conjunctivae and EOM are normal. No scleral icterus.    Neck: Normal range addition she has numerous calvarial lesions with the largest being the left frontal calvarium measuring 2 x 1.3 cm.           MRI of the cervical spine from [facility] MRI dated 8/31/2019 shows multiple foci of abnormal T1 marrow signal with patchy increa CVS:  S1S2 RRR no murmurs  LUNGS:  No audible wheezing  ABDOMEN: Non-distended  MUSCULOSKELETAL:  There was no deformity. There was full range of motion in all the extremities.    EXTREMITIES: There was no edema, clubbing or cyanosis  NEUROLOGICAL:  Ther

## 2019-09-16 NOTE — ANESTHESIA PROCEDURE NOTES
Airway  Urgency: elective      General Information and Staff    Patient location during procedure: OR  Anesthesiologist: Jazmyne Cutler MD  Performed: anesthesiologist     Indications and Patient Condition  Indications for airway management: anesthesia

## 2019-09-16 NOTE — PLAN OF CARE
Problem: Patient Centered Care  Goal: Patient preferences are identified and integrated in the patient's plan of care  Description  Interventions:  - What would you like us to know as we care for you?   - Provide timely, complete, and accurate informatio guidelines  Outcome: Progressing     Problem: SAFETY ADULT - FALL  Goal: Free from fall injury  Description  INTERVENTIONS:  - Assess pt frequently for physical needs  - Identify cognitive and physical deficits and behaviors that affect risk of falls.   - I tolerated, if ordered  - Obtain nutritional consult as needed  - Evaluate fluid balance  Outcome: Progressing  Goal: Maintains or returns to baseline bowel function  Description  INTERVENTIONS:  - Assess bowel function  - Maintain adequate hydration with I

## 2019-09-16 NOTE — TELEPHONE ENCOUNTER
Patient saw Dr. Cynthia Holly hip specialist at [facility] orthopedic.   She was admitted to [facility] on Friday she is planned for a left femur rodding today, September 16.    she continues to have significant skull pain and left hip pain thoracic and rib pa

## 2019-09-16 NOTE — ANESTHESIA PREPROCEDURE EVALUATION
Anesthesia PreOp Note    HPI:     [facility] is a 59year old female who presents for preoperative consultation requested by: Basilia Harada, MD    Date of Surgery: 9/13/2019 - 9/16/2019    Procedure(s):  HIP OPEN REDUCTION INTERNAL FIXATION  Indication: Take by mouth. Disp:  Rfl:  Taking   B Complex Vitamins (VITAMIN B COMPLEX OR) Take 1 tablet by mouth daily.    Disp:  Rfl:  Taking       Current Facility-Administered Medications Ordered in Epic:  [MAR Hold] Tranexamic Acid (CYKLOKAPRON) 1,000 mg in sodium [facility]-ordered outpatient medications on file. No Known Allergies    History reviewed. No pertinent family history.   Social History    Socioeconomic History      Marital status: Single      Spouse name: Not on file      Number of children: Not on file 09/14/2019    K 4.4 09/14/2019     09/14/2019    CO2 29.0 09/14/2019    BUN 16 09/14/2019    CREATSERUM 0.71 09/14/2019    GLU 97 09/14/2019    CA 10.8 (H) 09/14/2019     Lab Results   Component Value Date    INR 1.08 09/14/2019       Vital Signs:   B

## 2019-09-16 NOTE — DIETARY NOTE
ADULT NUTRITION INITIAL ASSESSMENT    Pt is at moderate nutrition risk. Pt does not meet malnutrition criteria.       RECOMMENDATIONS TO MD:  See Nutrition Intervention     NUTRITION DIAGNOSIS/PROBLEM:  Inadequate oral intake related to decreased ability t pt on the importance of both protein and carbohydrates to prevent LBM wasting.   - Coordination of nutrition care: collaboration with other providers  - Discharge and transfer of nutrition care to new setting or provider: monitor plans- from home     ADMIT scopolamine  1 patch Transdermal Once   • gabapentin  600 mg Oral Once   • epinephrine/ketorolac/morphine pain cocktail   Intra-articular Once   • Tranexamic Acid  1,950 mg Oral Once   • cetirizine  10 mg Oral Daily     CONTINUOUS MEDICATIONS:   • sodium c

## 2019-09-16 NOTE — CONSULTS
[facility] - [facility]  Orthopedic consultation    Mesfin Lauro Patient Status:  Inpatient    1955 MRN [id number]   Location [place] [address] Attending Jovanni Rdz MD   Hosp Day # 3 PCP No primary care provider on file. 0 Taking   HYDROcodone-acetaminophen (NORCO)  MG Oral Tab Take 1 tablet by mouth every 4 (four) hours as needed for Pain. Disp: 60 tablet Rfl: 0 Taking   Estradiol-Norethindrone Acet (LOPREEZA) 1-0.5 MG Oral Tab Take 1 tablet by mouth daily.  Disp: 90 treatment options and given her pain especially with walking it was reasonable to perform the prophylactic rodding. We will also examine the tissue for possible neoplasm. The treatment options including medical management were discussed at length.  The tarik

## 2019-09-17 LAB
ALBUMIN SERPL-MCNC: 4.5 G/DL (ref 3.1–4.5)
ALBUMIN/GLOB SERPL: 0.76 {RATIO}
ALPHA1 GLOB SERPL ELPH-MCNC: 0.26 G/DL (ref 0.1–0.3)
ALPHA2 GLOB SERPL ELPH-MCNC: 1.01 G/DL (ref 0.6–1)
ANION GAP SERPL CALC-SCNC: 5 MMOL/L (ref 0–18)
B-GLOBULIN SERPL ELPH-MCNC: 0.76 G/DL (ref 0.7–1.2)
BASOPHILS # BLD AUTO: 0.02 X10(3) UL (ref 0–0.2)
BASOPHILS NFR BLD AUTO: 0.1 %
BUN BLD-MCNC: 16 MG/DL (ref 7–18)
BUN/CREAT SERPL: 25.4 (ref 10–20)
CALCIUM BLD-MCNC: 9.8 MG/DL (ref 8.5–10.1)
CHLORIDE SERPL-SCNC: 98 MMOL/L (ref 98–112)
CO2 SERPL-SCNC: 29 MMOL/L (ref 21–32)
CREAT BLD-MCNC: 0.63 MG/DL (ref 0.55–1.02)
DEPRECATED RDW RBC AUTO: 47.6 FL (ref 35.1–46.3)
EOSINOPHIL # BLD AUTO: 0 X10(3) UL (ref 0–0.7)
EOSINOPHIL NFR BLD AUTO: 0 %
ERYTHROCYTE [DISTWIDTH] IN BLOOD BY AUTOMATED COUNT: 14.3 % (ref 11–15)
GAMMA GLOB SERPL ELPH-MCNC: 3.87 G/DL (ref 0.6–1.6)
GLUCOSE BLD-MCNC: 112 MG/DL (ref 70–99)
HAV IGM SER QL: 2 MG/DL (ref 1.6–2.6)
HCT VFR BLD AUTO: 32.9 % (ref 35–48)
HGB BLD-MCNC: 10.9 G/DL (ref 12–16)
IMM GRANULOCYTES # BLD AUTO: 0.12 X10(3) UL (ref 0–1)
IMM GRANULOCYTES NFR BLD: 0.7 %
KAPPA FREE LIGHT CHAIN: 3.57 MG/DL (ref 0.33–1.94)
KAPPA/LAMBDA FLC RATIO: 8.12 (ref 0.26–1.65)
LAMBDA FREE LIGHT CHAIN: 0.44 MG/DL (ref 0.57–2.63)
LYMPHOCYTES # BLD AUTO: 0.74 X10(3) UL (ref 1–4)
LYMPHOCYTES NFR BLD AUTO: 4.1 %
M PROTEIN MFR SERPL ELPH: 10.4 G/DL (ref 6.4–8.2)
M-SPIKE 1: 3.66 G/DL (ref ?–0)
MCH RBC QN AUTO: 30.1 PG (ref 26–34)
MCHC RBC AUTO-ENTMCNC: 33.1 G/DL (ref 31–37)
MCV RBC AUTO: 90.9 FL (ref 80–100)
MONOCYTES # BLD AUTO: 1.65 X10(3) UL (ref 0.1–1)
MONOCYTES NFR BLD AUTO: 9.1 %
NEUTROPHILS # BLD AUTO: 15.59 X10 (3) UL (ref 1.5–7.7)
NEUTROPHILS # BLD AUTO: 15.59 X10(3) UL (ref 1.5–7.7)
NEUTROPHILS NFR BLD AUTO: 86 %
OSMOLALITY SERPL CALC.SUM OF ELEC: 276 MOSM/KG (ref 275–295)
PLATELET # BLD AUTO: 349 10(3)UL (ref 150–450)
POTASSIUM SERPL-SCNC: 4.4 MMOL/L (ref 3.5–5.1)
RBC # BLD AUTO: 3.62 X10(6)UL (ref 3.8–5.3)
SODIUM SERPL-SCNC: 132 MMOL/L (ref 136–145)
TSI SER-ACNC: 0.45 MIU/ML (ref 0.36–3.74)
WBC # BLD AUTO: 18.1 X10(3) UL (ref 4–11)

## 2019-09-17 PROCEDURE — 99232 SBSQ HOSP IP/OBS MODERATE 35: CPT | Performed by: HOSPITALIST

## 2019-09-17 PROCEDURE — 99231 SBSQ HOSP IP/OBS SF/LOW 25: CPT | Performed by: INTERNAL MEDICINE

## 2019-09-17 RX ORDER — ASPIRIN 325 MG
325 TABLET, DELAYED RELEASE (ENTERIC COATED) ORAL DAILY
Status: DISCONTINUED | OUTPATIENT
Start: 2019-09-17 | End: 2019-09-18

## 2019-09-17 RX ORDER — SENNA AND DOCUSATE SODIUM 50; 8.6 MG/1; MG/1
2 TABLET, FILM COATED ORAL DAILY
Status: DISCONTINUED | OUTPATIENT
Start: 2019-09-17 | End: 2019-09-18

## 2019-09-17 RX ORDER — POLYETHYLENE GLYCOL 3350 17 G/17G
17 POWDER, FOR SOLUTION ORAL DAILY
Status: DISCONTINUED | OUTPATIENT
Start: 2019-09-17 | End: 2019-09-18

## 2019-09-17 RX ORDER — HYDROCODONE BITARTRATE AND ACETAMINOPHEN 5; 325 MG/1; MG/1
1 TABLET ORAL EVERY 6 HOURS PRN
Qty: 60 TABLET | Refills: 0 | Status: SHIPPED | OUTPATIENT
Start: 2019-09-17 | End: 2019-10-04

## 2019-09-17 NOTE — PROGRESS NOTES
[facility]    Progress Note    Ammy Hensley Patient Status:  Inpatient    1955 MRN [id number]   Location [facility] 4W/SW/SE Attending Leopold Mcalpine, MD   Hosp Day # 4 PCP No primary care provider on file.        Subjecti 09/16/2019       Xr Fluoroscopy C-arm Time <1 Hour  (cpt=76000)    Result Date: 9/16/2019  CONCLUSION:  1. Fluoroscopic guidance utilized during ORIF procedure left femur.     Dictated by (CST): Liang Marie MD on 9/16/2019 at 22:38     Approved by (

## 2019-09-17 NOTE — PROGRESS NOTES
[address] Patient Status:  Inpatient    1955 MRN [id number]   Location [facility] 4W/SW/SE Attending Kris Garcia MD   Hosp Day # 4 PCP No primary care provider on file.      Subjective:  Post-Operative Day: 1 S MG/ML injection 4 mg 4 mg Intravenous Q2H PRN   HYDROcodone-acetaminophen (NORCO) 5-325 MG per tab 1 tablet 1 tablet Oral Q6H PRN   docusate sodium (COLACE) cap 100 mg 100 mg Oral BID PRN   HYDROcodone-acetaminophen (NORCO)  MG per tab 1 tablet 1 tab

## 2019-09-17 NOTE — PHYSICAL THERAPY NOTE
PHYSICAL THERAPY EVALUATION - INPATIENT     Room Number: 681/067-Y  Evaluation Date: 9/17/2019  Type of Evaluation: Initial   Physician Order: PT Eval and Treat    Presenting Problem: Lytic bone lesion of L femur, s/p ORIF L hip on 9/16/19  Reason for The on documentation in the [facility] '6 clicks' Inpatient Basic Mobility Short Form. Research supports that patients with this level of impairment may benefit from HHPT vs OOPT pending progress (primarily on stair negotiation).     Patient will benefit from rosio FT    SUBJECTIVE  \"My hip hurts, but my side where the tumor is hurts more right now\"    PHYSICAL THERAPY EXAMINATION     OBJECTIVE  Precautions: Limb alert - left  Fall Risk: Standard fall risk    WEIGHT BEARING RESTRICTION  Weight Bearing Restriction: L Decreased stance time(decreased deven speed)  Stoop/Curb Assistance: Not tested     Exercise/Education Provided:  Bed mobility  Body mechanics  Energy conservation  Functional activity tolerated  Gait training  Posture  Strengthening  Lower therapeutic

## 2019-09-17 NOTE — PLAN OF CARE
Problem: Patient Centered Care  Goal: Patient preferences are identified and integrated in the patient's plan of care  Description  Interventions:  - What would you like us to know as we care for you?  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  - Provide timely, co ordered  - Implement neutropenic guidelines  Outcome: Progressing     Problem: SAFETY ADULT - FALL  Goal: Free from fall injury  Description  INTERVENTIONS:  - Assess pt frequently for physical needs  - Identify cognitive and physical deficits and behavior appropriate  - Advance diet as tolerated, if ordered  - Obtain nutritional consult as needed  - Evaluate fluid balance  Outcome: Progressing  Goal: Maintains or returns to baseline bowel function  Description  INTERVENTIONS:  - Assess bowel function  - Linnette Khan level they choose  - Honor patient and family perspectives and choices  Outcome: Progressing     Problem: Patient/Family Goals  Goal: Patient/Family Long Term Goal  Description  Patient's Long Term Goal:     Interventions:  -   - See additional Care Plan g reduce risk of injury  - Provide assistive devices as appropriate  - Consider OT/PT consult to assist with strengthening/mobility  - Encourage toileting schedule  Outcome: Progressing     Problem: DISCHARGE PLANNING  Goal: Discharge to home or other facili pharmacy to review patient's medication profile  Outcome: Progressing  Goal: Maintains adequate nutritional intake (undernourished)  Description  INTERVENTIONS:  - Monitor percentage of each meal consumed  - Identify factors contributing to decreased intak

## 2019-09-17 NOTE — PACU NOTE
Patient throwing frequent PVC's, denies chest pain or shortness of breath, Dr. Urias Draft still here and notified her, ordered labs, blood drawn and sent to lab.

## 2019-09-17 NOTE — OPERATIVE REPORT
OPERATIVE REPORT  PATIENT NAME:  Leeanne Briseno  MEDICAL RECORD #:  [id number]  SURGEON:  Maksim Zamora M.D.  [facility]:   2019  : 1955    Impending fracture left intertrochanteric femur fracture.  Intramedullary faizan in the left hip usi All bony prominences were well-padded. We examined the femur under fluoroscopy to determine the appropriate position of the fluoroscopy unit. External positions were felt to be present.  At this point, the left hip and left lower extremity was then preppe confirming in AP and lateral direction. Proper size and fit was noted. At this point, both wounds were thoroughly irrigated, hemostasis confirmed, and closure was then begun.     The fascial layers were then reapproximated using #2 PDS in a figure-of-eight

## 2019-09-17 NOTE — PAYOR COMM NOTE
--------------  CONTINUED STAY REVIEW    Payor: BCRYAN PP  Subscriber #:  [id number]  Authorization Number: [id number]    Admit date: 9/13/19  Admit time: 1612    Admitting Physician: Daryn Brooke MD  Attending Physician:  Shona Barlow MD  Primary Car discussed.     DESCRIPTION OF PROCEDURE: The patient was placed on the fracture table table and general anesthesia was achieved. The patient was then placed in fracture boots and manipulated under fluoroscopic control.   All bony prominences were well-padde static position. These measured 36 and 38 mm. Appropriate size screw was then inserted. Proper size and fit of the distal screw was also noted on the AP and lateral fluoroscopy.  At this point, on fluoroscopic control, it was confirming in AP and lateral d hours:   Temp:  [98 °F (36.7 °C)-99 °F (37.2 °C)] 98 °F (36.7 °C)  Pulse:  [] 106  Resp:  [10-24] 14  BP: ()/(63-85) 97/63     Hospital Medications:     Current Facility-Administered Medications:  scopolamine (TRANSDERM-SCOP) patch 1 patch Trans there is drainage      LOS: 4 days             Electronically signed by BRE Harrison at 9/17/2019  6:13 AM     MEDICATIONS ADMINISTERED IN LAST 1 DAY:  acetaminophen (TYLENOL EXTRA STRENGTH) tab 1,000 mg     Date Action Dose Route User    9/16/2019 160 RN      gabapentin (NEURONTIN) tab 600 mg     Date Action Dose Route User    9/16/2019 1607 Given 600 mg Oral Ashley Gamble, NACHO      HYDROcodone-acetaminophen [facility])  MG per tab 1 tablet     Date Action Dose Route User    9/17/2019 0504 Given 1 tabl 9/16/2019 1726 Continued by Anesthesia (none) Intravenous Eliud Loomis MD      succinylcholine chloride (ANECTINE) injection     Date Action Dose Route User    9/16/2019 1733 Given 60 mg Intravenous Eliud Loomis MD

## 2019-09-17 NOTE — ANESTHESIA POSTPROCEDURE EVALUATION
Patient: Josue Simple    Procedure Summary     Date:  09/16/19 Room / Location:  [address] [place] [address] MAIN OR 05 / 300 [facility] [place] MAIN OR    Anesthesia Start:  1726 Anesthesia Stop:  1958    Procedure:  HIP OPEN REDUCTION INTERNAL FIXATION (Left Hip) Diagnosis:  (left hip lesion)

## 2019-09-17 NOTE — PLAN OF CARE
Problem: Patient Centered Care  Goal: Patient preferences are identified and integrated in the patient's plan of care  Description  Interventions:  - What would you like us to know as we care for you?   - Provide timely, complete, and accurate informatio period  Description  INTERVENTIONS  - Monitor WBC  - Administer growth factors as ordered  - Implement neutropenic guidelines  Outcome: Progressing     Problem: SAFETY ADULT - FALL  Goal: Free from fall injury  Description  INTERVENTIONS:  - Assess pt freq ordered antiemetic medications  - Provide nonpharmacologic comfort measures as appropriate  - Advance diet as tolerated, if ordered  - Obtain nutritional consult as needed  - Evaluate fluid balance  Outcome: Progressing  Goal: Maintains or returns to basel

## 2019-09-18 ENCOUNTER — APPOINTMENT (OUTPATIENT)
Dept: LAB | Facility: HOSPITAL | Age: 64
End: 2019-09-18
Attending: PHYSICIAN ASSISTANT
Payer: COMMERCIAL

## 2019-09-18 ENCOUNTER — HOSPITAL ENCOUNTER (OUTPATIENT)
Dept: ULTRASOUND IMAGING | Facility: HOSPITAL | Age: 64
Discharge: HOME | End: 2019-09-18
Attending: PHYSICIAN ASSISTANT
Payer: COMMERCIAL

## 2019-09-18 VITALS
SYSTOLIC BLOOD PRESSURE: 112 MMHG | OXYGEN SATURATION: 95 % | RESPIRATION RATE: 18 BRPM | TEMPERATURE: 98 F | DIASTOLIC BLOOD PRESSURE: 70 MMHG | WEIGHT: 121.38 LBS | HEIGHT: 65 IN | BODY MASS INDEX: 20.22 KG/M2 | HEART RATE: 101 BPM

## 2019-09-18 PROBLEM — D68.0 VON WILLEBRAND DISEASE (HCC): Status: ACTIVE | Noted: 2019-09-18

## 2019-09-18 PROBLEM — D68.00 VON WILLEBRAND DISEASE (HCC): Status: ACTIVE | Noted: 2019-09-18

## 2019-09-18 LAB
VON WILLEBRAND FACTOR ACTIVITY: 177 %
VON WILLEBRAND FACTOR ANTIGEN: 229 %

## 2019-09-18 PROCEDURE — 99239 HOSP IP/OBS DSCHRG MGMT >30: CPT | Performed by: HOSPITALIST

## 2019-09-18 NOTE — PHYSICAL THERAPY NOTE
PHYSICAL THERAPY TREATMENT NOTE - INPATIENT    Room Number: 339/375-W     Session:        Presenting Problem: lytic bone lesion of L femur.  s/p ORIF L hip     Problem List  Active Problems:    Lytic bone lesion of femur      Past Medical History  Past Med and standing up from a chair with arms (e.g., wheelchair, bedside commode, etc.): None   -   Moving from lying on back to sitting on the side of the bed?: None   How much help from another person does the patient currently need. ..   -   Moving to and from training;Strengthening;Range of motion;Stoop training;Stair training;Transfer training;Balance training  Rehab Potential : Good  Frequency (Obs): Daily    CURRENT GOALS   Goals to be met by: 10/1/19  Patient Goal Patient's self-stated goal is: return to PL

## 2019-09-18 NOTE — OCCUPATIONAL THERAPY NOTE
OCCUPATIONAL THERAPY EVALUATION - INPATIENT     Room Number: 223/966-K  Evaluation Date: 9/18/2019  Type of Evaluation: Initial  Presenting Problem: (s/p ORIF L hip 2/2 lytic bone lesion of left femur)    Physician Order: IP Consult to Occupational Thera managing ADLs and IADLs at home. The patient's Approx Degree of Impairment: 15.86% has been calculated based on documentation in the [facility] '6 clicks' Inpatient Daily Activity Short Form.   Research supports that patients with this level of impairment is saf None    Stairs in Home: 1STE, 15 inside  Use of Assistive Device(s): owns rollator    Prior Level of [place]: Prior to admission, patient was living at home alone. She was independent with self-care and ambulation.  She works as an  full time fair-    FUNCTIONAL ADL ASSESSMENT  Grooming: indep  Feeding: indep  Bathing: SBA  Toileting: indep  Upper Extremity Dressing: indep  Lower Extremity Dressing: mod I     Education Provided: Educated patient in role of OT and POC  Patient End of Session: Up

## 2019-09-18 NOTE — PROGRESS NOTES
[address] Patient Status:  Inpatient    1955 MRN [id number]   Location [facility] 4W/SW/SE Attending Selena Bonner MD   Hosp Day # 5 PCP No primary care provider on file.      Subjective:  Post-Operative Day: 2 S morphINE sulfate (PF) 2 MG/ML injection 2 mg 2 mg Intravenous Q2H PRN   Or      morphINE sulfate (PF) 4 MG/ML injection 4 mg 4 mg Intravenous Q2H PRN   HYDROcodone-acetaminophen (NORCO) 5-325 MG per tab 1 tablet 1 tablet Oral Q6H PRN   docusate sodium (C

## 2019-09-18 NOTE — PLAN OF CARE
Problem: PAIN - ADULT  Goal: Verbalizes/displays adequate comfort level or patient's stated pain goal  Description  INTERVENTIONS:  - Encourage pt to monitor pain and request assistance  - Assess pain using appropriate pain scale  - Administer analgesics appropriate  - Identify discharge learning needs (meds, wound care, etc)  - Arrange for interpreters to assist at discharge as needed  - Consider post-discharge preferences of patient/family/discharge partner  - Complete POLST form as appropriate  - Assess environment  Outcome: Progressing     Problem: GENITOURINARY - ADULT  Goal: Absence of urinary retention  Description  INTERVENTIONS:  - Assess patient’s ability to void and empty bladder  - Monitor intake/output and perform bladder scan as needed  - [facility]

## 2019-09-18 NOTE — PLAN OF CARE
Pt A&Ox4, VSS. Pain is being controlled with PRN [place]. Dressing is CDI. Pt up with 1 assist and a walker. Plan is for discharge today to home with daughter.      Problem: Patient Centered Care  Goal: Patient preferences are identified and integrated in the assessment.  - Educate pt/family on patient safety including physical limitations  - Instruct pt to call for assistance with activity based on assessment  - Modify environment to reduce risk of injury  - Provide assistive devices as appropriate  - Consider effectiveness of GI medications  - Encourage mobilization and activity  - Obtain nutritional consult as needed  - Establish a toileting routine/schedule  - Consider collaborating with pharmacy to review patient's medication profile  Outcome: Progressing  G

## 2019-09-18 NOTE — PROGRESS NOTES
[facility]    Progress Note    Vincenzo Saucedo Patient Status:  Inpatient    1955 MRN [id number]   Location [facility] 4W/SW/SE Attending Jocelyn Chaney MD   Hosp Day # 4 PCP No primary care provider on file.      Chun Mckinney alert and oriented to person, place, and time. No cranial nerve deficit. Skin: Skin is warm and dry. There is pallor. Dressings over surgical sites x3  No ecchymoses   Psychiatric: She has a normal mood and affect.  Her behavior is normal. Judgment and (CST): Dary Walker MD on 9/16/2019 at 22:44     Approved by (CST): Dary Walker MD on 9/16/2019 at 22:46          Ekg 12-lead    Result Date: 9/17/2019  ECG Report  Interpretation  -------------------------- Sinus Rhythm - occasional ectopic

## 2019-09-20 ENCOUNTER — TELEPHONE (OUTPATIENT)
Dept: INTERNAL MEDICINE CLINIC | Facility: CLINIC | Age: 64
End: 2019-09-20

## 2019-09-20 ENCOUNTER — TELEPHONE (OUTPATIENT)
Dept: SURGERY | Facility: CLINIC | Age: 64
End: 2019-09-20

## 2019-09-20 LAB — BLOOD TYPE BARCODE: 6200

## 2019-09-20 RX ORDER — DEXAMETHASONE 2 MG/1
2 TABLET ORAL 2 TIMES DAILY WITH MEALS
Qty: 30 TABLET | Refills: 0 | Status: SHIPPED | OUTPATIENT
Start: 2019-09-20 | End: 2019-12-20

## 2019-09-20 NOTE — TELEPHONE ENCOUNTER
Problems   This clinical information was not verified.    Medications   This clinical information was not verified, but there are updates pending review:   No Longer Applicable Medications Start Date Reported By  Comments   predniSONE 10 MG Tabs 9/6

## 2019-09-20 NOTE — PAYOR COMM NOTE
--------------  DISCHARGE REVIEW    Payor: CYNDIE RUTLEDGE  Subscriber #:  [id number]  Authorization Number: [id number]    Admit date: 9/13/19  Admit time:  1612  Discharge Date: 9/18/2019  2:50 PM     Admitting Physician: Lilli Morrell MD  Primary Care Physicia

## 2019-09-24 ENCOUNTER — HOSPITAL ENCOUNTER (OUTPATIENT)
Dept: CT IMAGING | Facility: HOSPITAL | Age: 64
Discharge: HOME | End: 2019-09-24
Attending: OTOLARYNGOLOGY
Payer: COMMERCIAL

## 2019-09-24 ENCOUNTER — OFFICE VISIT (OUTPATIENT)
Dept: HEMATOLOGY/ONCOLOGY | Facility: HOSPITAL | Age: 64
End: 2019-09-24
Attending: NEUROLOGICAL SURGERY
Payer: COMMERCIAL

## 2019-09-24 VITALS
WEIGHT: 117 LBS | SYSTOLIC BLOOD PRESSURE: 129 MMHG | HEART RATE: 102 BPM | OXYGEN SATURATION: 98 % | DIASTOLIC BLOOD PRESSURE: 81 MMHG | BODY MASS INDEX: 19.49 KG/M2 | TEMPERATURE: 97 F | HEIGHT: 65 IN | RESPIRATION RATE: 16 BRPM

## 2019-09-24 DIAGNOSIS — G93.9 BRAIN LESION: ICD-10-CM

## 2019-09-24 DIAGNOSIS — C90.00 MULTIPLE MYELOMA NOT HAVING ACHIEVED REMISSION (HCC): Primary | ICD-10-CM

## 2019-09-24 DIAGNOSIS — M89.9 SKULL LESION: ICD-10-CM

## 2019-09-24 DIAGNOSIS — J34.89 LESION OR MASS OF PARANASAL SINUSES: ICD-10-CM

## 2019-09-24 PROCEDURE — 70486 CT MAXILLOFACIAL W/O DYE: CPT | Performed by: OTOLARYNGOLOGY

## 2019-09-24 PROCEDURE — 99245 OFF/OP CONSLTJ NEW/EST HI 55: CPT | Performed by: INTERNAL MEDICINE

## 2019-09-24 NOTE — PROGRESS NOTES
Velcade Revlimid and Dex  Start the dex 20mg daily x2days tonight. First dose of Velcade/Dex on Friday. Start revlimid when it becomes available. Will need referral to [facility] BMT service.

## 2019-09-25 ENCOUNTER — TELEPHONE (OUTPATIENT)
Dept: HEMATOLOGY/ONCOLOGY | Facility: HOSPITAL | Age: 64
End: 2019-09-25

## 2019-09-25 DIAGNOSIS — C90.00 MULTIPLE MYELOMA NOT HAVING ACHIEVED REMISSION (HCC): ICD-10-CM

## 2019-09-25 RX ORDER — ONDANSETRON 4 MG/1
TABLET, FILM COATED ORAL
Refills: 3 | COMMUNITY
Start: 2019-09-21 | End: 2019-10-18

## 2019-09-25 RX ORDER — PROCHLORPERAZINE MALEATE 10 MG
10 TABLET ORAL EVERY 6 HOURS PRN
Qty: 30 TABLET | Refills: 3 | Status: SHIPPED | OUTPATIENT
Start: 2019-09-25

## 2019-09-25 RX ORDER — DEXAMETHASONE 4 MG/1
20 TABLET ORAL
Qty: 40 TABLET | Refills: 3 | Status: SHIPPED | OUTPATIENT
Start: 2019-09-25 | End: 2019-09-27

## 2019-09-25 RX ORDER — ACYCLOVIR 200 MG/1
400 CAPSULE ORAL 2 TIMES DAILY
Qty: 60 CAPSULE | Refills: 3 | Status: SHIPPED | OUTPATIENT
Start: 2019-09-25 | End: 2019-11-26

## 2019-09-25 RX ORDER — ONDANSETRON HYDROCHLORIDE 8 MG/1
8 TABLET, FILM COATED ORAL EVERY 8 HOURS PRN
Qty: 30 TABLET | Refills: 3 | Status: SHIPPED | OUTPATIENT
Start: 2019-09-25

## 2019-09-25 NOTE — CONSULTS
Cancer Center Report of Consultation    Patient Name: Allen Siemens   YOB: 1955   Medical Record Number: [id number]   CSN: [id number]   Consulting Physician: Caridad Buerger, M.D. Referring Physician: No ref.  provider found    Date of Atypical glandular cell changes cervix of undetermined significance favor dysplasia 02/12/2014    hpv+   • Encounter for screening for human papillomavirus (HPV) 10/08/2014    normal   • HPV in female    • Von Willebrand disease [facility])        Past Surgical [employer] service: Not on file        Active member of club or organization: Not on file        Attends meetings of clubs or organizations: Not on file        Relationship status: Not on file      Intimate partner violence:        Fear of current or ex par Estradiol-Norethindrone Acet (LOPREEZA) 1-0.5 MG Oral Tab, Take 1 tablet by mouth daily. , Disp: 90 tablet, Rfl: 3    Allergies:  No Known Allergies     Review of Systems:    Constitutional Normal - No fevers, chills, night sweats, excessive fatigue or weig clear to auscultation without rhonchi or wheezing. Cardiovascular Normal - Regular rate and rhythm of heart without murmurs, gallops or rubs. Abdomen Normal - Non-tender, non-distended, no masses, ascites or hepatosplenomegaly. Good bowel sounds.  No gu MCV Latest Ref Range: 80.0 - 100.0 fL 90.9   RDW Latest Ref Range: 11.0 - 15.0 % 14.3   RDW-SD Latest Ref Range: 35.1 - 46.3 fL 47.6 (H)   Prelim Neutrophil Abs Latest Ref Range: 1.50 - 7.70 x10 (3) uL 15.59 (H)   Neutrophils Absolute Latest Ref Range: 1 biopsy:   · Plasma cell neoplasm.     Comment: Sections of the left hip intramedullary lytic lesion demonstrate fragments of bone and bone marrow with sheets of plasma cells, many of which are large and display multi nucleation or prominent nucleoli.   The after cycle 1 or 2 of chemo. Planned Follow Up:  Friday for C1D1 velcade. I spent 60 minutes face to face with the patient. More than 50% of that time was spent counseling the patient and/or on coordination of care.   The diagnosis, prognosis, and

## 2019-09-25 NOTE — TELEPHONE ENCOUNTER
Spoke to Jose Luis Garcia. They are not able to do the [facility] panel now because they need fresh tissue and they had it, but now it is too late to run the test. Dr. Richard Mendoza notified.

## 2019-09-25 NOTE — TELEPHONE ENCOUNTER
Please call Jorge Paredes from Pathology at [place] [address], she spoke to [facility] on 9/24/19 regarding a Fish Panel Dr Brody Reyna ordered and she have questions and need a callback, please advise.

## 2019-09-25 NOTE — TELEPHONE ENCOUNTER
REMS enrollment completed and Revlimid script along with auth# faxed to [facility].

## 2019-09-26 ENCOUNTER — TELEPHONE (OUTPATIENT)
Dept: SURGERY | Facility: CLINIC | Age: 64
End: 2019-09-26

## 2019-09-26 DIAGNOSIS — C90.00 MULTIPLE MYELOMA NOT HAVING ACHIEVED REMISSION (HCC): Primary | ICD-10-CM

## 2019-09-26 NOTE — TELEPHONE ENCOUNTER
Patient was called. Her double vision is about the same steroids. Her headache is much better she is having some left hip surgical pain. She does have Norco.  She spent yesterday at [facility] for a thorough examination, afterwards she was exhausted.

## 2019-09-27 ENCOUNTER — TELEPHONE (OUTPATIENT)
Dept: SURGERY | Facility: CLINIC | Age: 64
End: 2019-09-27

## 2019-09-27 ENCOUNTER — OFFICE VISIT (OUTPATIENT)
Dept: HEMATOLOGY/ONCOLOGY | Facility: HOSPITAL | Age: 64
End: 2019-09-27
Attending: NEUROLOGICAL SURGERY
Payer: COMMERCIAL

## 2019-09-27 VITALS
SYSTOLIC BLOOD PRESSURE: 121 MMHG | BODY MASS INDEX: 19.36 KG/M2 | RESPIRATION RATE: 16 BRPM | WEIGHT: 116.19 LBS | HEIGHT: 65 IN | TEMPERATURE: 98 F | OXYGEN SATURATION: 97 % | HEART RATE: 109 BPM | DIASTOLIC BLOOD PRESSURE: 80 MMHG

## 2019-09-27 DIAGNOSIS — Z71.89 OTHER SPECIFIED COUNSELING: ICD-10-CM

## 2019-09-27 DIAGNOSIS — C90.00 MULTIPLE MYELOMA NOT HAVING ACHIEVED REMISSION (HCC): Primary | ICD-10-CM

## 2019-09-27 PROCEDURE — 99215 OFFICE O/P EST HI 40 MIN: CPT | Performed by: CLINICAL NURSE SPECIALIST

## 2019-09-27 PROCEDURE — 96401 CHEMO ANTI-NEOPL SQ/IM: CPT

## 2019-09-27 RX ORDER — DEXAMETHASONE 4 MG/1
20 TABLET ORAL ONCE
Status: CANCELLED
Start: 2019-09-27

## 2019-09-27 RX ORDER — HYDROCODONE BITARTRATE AND ACETAMINOPHEN 10; 325 MG/1; MG/1
1 TABLET ORAL EVERY 4 HOURS PRN
Qty: 90 TABLET | Refills: 0 | Status: SHIPPED | OUTPATIENT
Start: 2019-09-27 | End: 2019-10-07

## 2019-09-27 RX ORDER — DEXAMETHASONE 4 MG/1
20 TABLET ORAL ONCE
Status: COMPLETED | OUTPATIENT
Start: 2019-09-27 | End: 2019-09-27

## 2019-09-27 RX ORDER — DEXAMETHASONE 4 MG/1
20 TABLET ORAL
Qty: 80 TABLET | Refills: 3 | Status: SHIPPED | OUTPATIENT
Start: 2019-09-27 | End: 2020-01-31 | Stop reason: ALTCHOICE

## 2019-09-27 RX ADMIN — DEXAMETHASONE 20 MG: 4 TABLET ORAL at 15:01:00

## 2019-09-27 NOTE — PROGRESS NOTES
Chemotherapy Education    Patient:Fely Tavarez     Date: 9/27/19   Diagnosis:  Multiple myeloma    Caregivers present: Daughter    Drug names:  Velcade, Revlimid, Dexamethasone    Treatment Effects on Bone Marrow:  Chemotherapy action on cancer / normal ChemoCare Chemotherapy information sheets  ACS Understanding Chemotherapy Booklet  ACS Nutrition for the Person with Cancer during Treatment / Dietician information sheet  When to contact the Treatment Team Information Sheet  Side Effect Management Inf

## 2019-09-27 NOTE — PROGRESS NOTES
Patient presents with:  Pt Ed: chemo education    Patient here for chemo education    Education Record    Learner:  Patient and Family Member    Disease / Diagnosis: Multiple myeloma not having achieved remission ([facility] [address]    Barriers / Limitations:  None   Co

## 2019-09-27 NOTE — PROGRESS NOTES
Pt here for C1D1.   Arrives Ambulating with walker, accompanied by Self and Family member           Patient denies possible pregnancy since last therapy cycle: Not Applicable    Modifications in dose or schedule: No     Frequency of blood return and site ch

## 2019-09-30 ENCOUNTER — DOCUMENTATION ONLY (OUTPATIENT)
Dept: HEMATOLOGY/ONCOLOGY | Facility: HOSPITAL | Age: 64
End: 2019-09-30

## 2019-09-30 ENCOUNTER — TELEPHONE (OUTPATIENT)
Dept: HEMATOLOGY/ONCOLOGY | Facility: HOSPITAL | Age: 64
End: 2019-09-30

## 2019-09-30 ENCOUNTER — OFFICE VISIT (OUTPATIENT)
Dept: HEMATOLOGY/ONCOLOGY | Facility: HOSPITAL | Age: 64
End: 2019-09-30
Attending: NEUROLOGICAL SURGERY
Payer: COMMERCIAL

## 2019-09-30 VITALS
SYSTOLIC BLOOD PRESSURE: 125 MMHG | BODY MASS INDEX: 19.36 KG/M2 | WEIGHT: 116.19 LBS | DIASTOLIC BLOOD PRESSURE: 76 MMHG | HEIGHT: 65 IN | HEART RATE: 116 BPM | TEMPERATURE: 98 F | OXYGEN SATURATION: 99 % | RESPIRATION RATE: 18 BRPM

## 2019-09-30 DIAGNOSIS — C90.00 MULTIPLE MYELOMA NOT HAVING ACHIEVED REMISSION (HCC): Primary | ICD-10-CM

## 2019-09-30 DIAGNOSIS — C90.00 MULTIPLE MYELOMA NOT HAVING ACHIEVED REMISSION (HCC): ICD-10-CM

## 2019-09-30 PROCEDURE — 96401 CHEMO ANTI-NEOPL SQ/IM: CPT

## 2019-09-30 PROCEDURE — 96374 THER/PROPH/DIAG INJ IV PUSH: CPT

## 2019-09-30 NOTE — TELEPHONE ENCOUNTER
Spoke with rep at [employer]. Confirmed that they received Revlimid script and clarified diagnoses code and celgene auth #. Script will be filled at [facility] and is currently in process. They will be contacting the patient.      Slick Pickens

## 2019-09-30 NOTE — PROGRESS NOTES
Date of Treatment: 9/27/19                                Type of Chemo: Bortezomib/Lenalidomide/Dexamethasone    Comments: patient was here today for C1/D4 Velcade. Complained of constipation.     Recommendations: See RN notes (also received her first Zome

## 2019-09-30 NOTE — PROGRESS NOTES
Pt here for C1D4.   Arrives Via wheelchair, accompanied by Family member           Patient denies possible pregnancy since last therapy cycle: Not Applicable    Modifications in dose or schedule: No     Frequency of blood return and site check throughout ad

## 2019-10-03 NOTE — PROGRESS NOTES
Cancer Center Progress Note    Patient Name: Viji Christine   YOB: 1955   Medical Record Number: [id number]   CSN: [id number]   Date of visit: 10/4/2019  Provider: NAKIA Mosley  Referring Physician: No ref.  provider found    Proble vision at this point. Discussed this with Dr. Chandrakant White who noted the pt's L eye was tracking better. Headaches are stable. No fever. No swelling. Still waiting for Revlimid to arrive. No N/V. No bleeding.       Allergies:  No Known Allergies    Natasha Take 1 tablet (325 mg total) by mouth daily. , Disp: 30 tablet, Rfl: 0  •  HYDROcodone-acetaminophen 5-325 MG Oral Tab, Take 1 tablet by mouth every 6 (six) hours as needed. , Disp: 60 tablet, Rfl: 0  •  cetirizine 10 MG Oral Tab, Take 10 mg by mouth daily. ,  Latest Ref Range: >=60  117   ANION GAP Latest Ref Range: 0 - 18 mmol/L 3   CALCULATED OSMOLALITY Latest Ref Range: 275 - 295 mOsm/kg 276   ALKALINE PHOSPHATASE Latest Ref Range: 50 - 130 U/L 87   AST (SGOT) Latest Ref Range: 15 - 37 U/L 3 65 y/o F with PMH of HTN, Anxiety, HLD and Hypothyroidism who presented s/p ECT therapy today with new onset atrial fibrillation.    +New onset atrial fibrillation-s/p Lovenox, and IV/PO Cardizem, started on Cardizem 30mg q6hrs for rate control   +Hypokalemia-currently getting supplemented

## 2019-10-04 ENCOUNTER — OFFICE VISIT (OUTPATIENT)
Dept: HEMATOLOGY/ONCOLOGY | Facility: HOSPITAL | Age: 64
End: 2019-10-04
Attending: NEUROLOGICAL SURGERY
Payer: COMMERCIAL

## 2019-10-04 VITALS
HEART RATE: 119 BPM | HEIGHT: 65 IN | OXYGEN SATURATION: 99 % | DIASTOLIC BLOOD PRESSURE: 84 MMHG | SYSTOLIC BLOOD PRESSURE: 120 MMHG | TEMPERATURE: 99 F | WEIGHT: 113.63 LBS | BODY MASS INDEX: 18.93 KG/M2 | RESPIRATION RATE: 18 BRPM

## 2019-10-04 DIAGNOSIS — C90.00 MULTIPLE MYELOMA NOT HAVING ACHIEVED REMISSION (HCC): Primary | ICD-10-CM

## 2019-10-04 DIAGNOSIS — D68.0 VON WILLEBRAND DISEASE (HCC): ICD-10-CM

## 2019-10-04 DIAGNOSIS — M89.9 LYTIC BONE LESION OF FEMUR: ICD-10-CM

## 2019-10-04 DIAGNOSIS — G89.3 NEOPLASM RELATED PAIN: ICD-10-CM

## 2019-10-04 DIAGNOSIS — K59.03 DRUG-INDUCED CONSTIPATION: ICD-10-CM

## 2019-10-04 DIAGNOSIS — E83.52 HYPERCALCEMIA OF MALIGNANCY: ICD-10-CM

## 2019-10-04 DIAGNOSIS — Z51.11 ENCOUNTER FOR ANTINEOPLASTIC CHEMOTHERAPY: ICD-10-CM

## 2019-10-04 PROBLEM — Z51.5 PALLIATIVE CARE BY SPECIALIST: Status: ACTIVE | Noted: 2019-10-04

## 2019-10-04 PROCEDURE — 80053 COMPREHEN METABOLIC PANEL: CPT

## 2019-10-04 PROCEDURE — 99215 OFFICE O/P EST HI 40 MIN: CPT | Performed by: CLINICAL NURSE SPECIALIST

## 2019-10-04 PROCEDURE — 99214 OFFICE O/P EST MOD 30 MIN: CPT | Performed by: NURSE PRACTITIONER

## 2019-10-04 PROCEDURE — 96401 CHEMO ANTI-NEOPL SQ/IM: CPT

## 2019-10-04 NOTE — PROGRESS NOTES
Patient presents with: Follow - Up: apn assessment prior to treatment    C1D8- Velcade Patient c/o very tired since last treatment no energy decreased appetite only eating small amount and drinking supplements and fluids.   Patient also c/o constipation de

## 2019-10-04 NOTE — PROGRESS NOTES
Per Trisha Bey, APN - ok to proceed without CBC today. Drawn CMP, but do not have to wait for results for treatment.

## 2019-10-04 NOTE — PROGRESS NOTES
Palliative Care Consult Note     Patient Name: Harrison Gifford   YOB: 1955   Medical Record Number: [id number]   CSN: [id number]   Date of visit: 10/4/2019     Reason for Consult:  Symptom management and goals of care    Chief Complaint/Reason Allergies:  No Known Allergies    Family History:  No family history on file.   Social History:  Social History    Socioeconomic History      Marital status: Single      Spouse name: Not on file      Number of children: Not on file      Years of educa by mouth every 6 (six) hours as needed for Nausea. Disp: 30 tablet Rfl: 0   dexamethasone 2 MG Oral Tab Take 1 tablet (2 mg total) by mouth 2 (two) times daily with meals.  Disp: 30 tablet Rfl: 0   aspirin  MG Oral Tab EC Take 1 tablet (325 mg total) pain doesn't improve, will need to switch to more potent opiate to optimize pain. She wants to try norco increase first.  Discussed importance of constipation prevention. She will use miralax daily. She will dulcolax today since she hasn't had BM.

## 2019-10-04 NOTE — PROGRESS NOTES
Pt here for C1D8.   Arrives Via wheelchair, accompanied by Family member           Patient denies possible pregnancy since last therapy cycle: No    Modifications in dose or schedule: No     Frequency of blood return and site check throughout administration

## 2019-10-07 ENCOUNTER — OFFICE VISIT (OUTPATIENT)
Dept: HEMATOLOGY/ONCOLOGY | Facility: HOSPITAL | Age: 64
End: 2019-10-07
Attending: NEUROLOGICAL SURGERY
Payer: COMMERCIAL

## 2019-10-07 VITALS
RESPIRATION RATE: 18 BRPM | WEIGHT: 113 LBS | SYSTOLIC BLOOD PRESSURE: 137 MMHG | HEART RATE: 113 BPM | DIASTOLIC BLOOD PRESSURE: 85 MMHG | OXYGEN SATURATION: 96 % | TEMPERATURE: 99 F | HEIGHT: 65 IN | BODY MASS INDEX: 18.83 KG/M2

## 2019-10-07 DIAGNOSIS — K59.03 DRUG-INDUCED CONSTIPATION: ICD-10-CM

## 2019-10-07 DIAGNOSIS — C90.00 MULTIPLE MYELOMA NOT HAVING ACHIEVED REMISSION (HCC): Primary | ICD-10-CM

## 2019-10-07 DIAGNOSIS — G89.3 NEOPLASM RELATED PAIN: Primary | ICD-10-CM

## 2019-10-07 PROCEDURE — 96401 CHEMO ANTI-NEOPL SQ/IM: CPT

## 2019-10-07 PROCEDURE — 99214 OFFICE O/P EST MOD 30 MIN: CPT | Performed by: NURSE PRACTITIONER

## 2019-10-07 RX ORDER — HYDROCODONE BITARTRATE AND ACETAMINOPHEN 10; 325 MG/1; MG/1
1-2 TABLET ORAL EVERY 4 HOURS PRN
Qty: 240 TABLET | Refills: 0 | Status: SHIPPED | OUTPATIENT
Start: 2019-10-07 | End: 2020-01-10

## 2019-10-07 NOTE — PROGRESS NOTES
Pt here for C1D11 Velcade for multiple myeloma.   Arrives Via wheelchair, accompanied by Family member           Patient denies possible pregnancy since last therapy cycle: n/a    Modifications in dose or schedule: No     Frequency of blood return and site

## 2019-10-08 NOTE — PROGRESS NOTES
Palliative Care Follow Up Note     Patient Name: Rajendra Sainz   YOB: 1955   Medical Record Number: [id number]   CSN: [id number]   Date of visit: 10/8/2019     Chief Complaint/Reason for Visit:  Palliative follow up     History of [place] History    Socioeconomic History      Marital status: Single      Spouse name: Not on file      Number of children: Not on file      Years of education: Not on file      Highest education level: Not on file    Tobacco Use      Smoking status: Never Smoker (six) hours as needed for Nausea. Disp: 30 tablet Rfl: 0   dexamethasone 2 MG Oral Tab Take 1 tablet (2 mg total) by mouth 2 (two) times daily with meals. Disp: 30 tablet Rfl: 0   aspirin  MG Oral Tab EC Take 1 tablet (325 mg total) by mouth daily.  D regimen and then prophylaxis for ongoing control. Impression/Plan:   1. Neoplasm related pain  norco 10-20mg Q 4 prn    2.  Drug-induced constipation  miralax 2 capfuls today  Senna 4 tabs today  Dulcolax supp today  miralax BID daily  Senna 2 tabs BID

## 2019-10-08 NOTE — PATIENT INSTRUCTIONS
Take miralax twice daily  Take senna 2 tabs twice daily.     Today:  Take 2 capfuls of miralax when you get home  Take 2 senna tabs and then again at night  Take a dulcolax suppository

## 2019-10-16 ENCOUNTER — TELEPHONE (OUTPATIENT)
Dept: HEMATOLOGY/ONCOLOGY | Facility: HOSPITAL | Age: 64
End: 2019-10-16

## 2019-10-16 NOTE — TELEPHONE ENCOUNTER
Patient called stating she has had diarrhea for several days since starting the revlimid. She is tolerating oral intake. She is pushing oral fluids. She started immodium last night which seemed to help a little.   She is unable to come today for assessme

## 2019-10-16 NOTE — TELEPHONE ENCOUNTER
Patient called stating she has used 6 immodium capsules and stools are now more formed and less frequent. They are better controlled. She is drinking at least 8 glasses of water and is tolerating some food.     She has a slight headache which started this

## 2019-10-18 ENCOUNTER — OFFICE VISIT (OUTPATIENT)
Dept: HEMATOLOGY/ONCOLOGY | Facility: HOSPITAL | Age: 64
End: 2019-10-18
Attending: INTERNAL MEDICINE
Payer: COMMERCIAL

## 2019-10-18 ENCOUNTER — OFFICE VISIT (OUTPATIENT)
Dept: HEMATOLOGY/ONCOLOGY | Facility: HOSPITAL | Age: 64
End: 2019-10-18
Attending: NEUROLOGICAL SURGERY
Payer: COMMERCIAL

## 2019-10-18 VITALS
RESPIRATION RATE: 18 BRPM | HEIGHT: 65 IN | WEIGHT: 113.5 LBS | DIASTOLIC BLOOD PRESSURE: 81 MMHG | TEMPERATURE: 99 F | HEART RATE: 123 BPM | SYSTOLIC BLOOD PRESSURE: 139 MMHG | BODY MASS INDEX: 18.91 KG/M2 | OXYGEN SATURATION: 99 %

## 2019-10-18 DIAGNOSIS — D63.0 ANEMIA COMPLICATING NEOPLASTIC DISEASE: ICD-10-CM

## 2019-10-18 DIAGNOSIS — C90.00 MULTIPLE MYELOMA NOT HAVING ACHIEVED REMISSION (HCC): Primary | ICD-10-CM

## 2019-10-18 DIAGNOSIS — G89.3 NEOPLASM RELATED PAIN: Primary | ICD-10-CM

## 2019-10-18 DIAGNOSIS — H53.2 DIPLOPIA: ICD-10-CM

## 2019-10-18 DIAGNOSIS — M89.9 LYTIC BONE LESIONS ON XRAY: ICD-10-CM

## 2019-10-18 DIAGNOSIS — R19.7 DIARRHEA, UNSPECIFIED TYPE: ICD-10-CM

## 2019-10-18 PROCEDURE — 99214 OFFICE O/P EST MOD 30 MIN: CPT | Performed by: NURSE PRACTITIONER

## 2019-10-18 PROCEDURE — 96401 CHEMO ANTI-NEOPL SQ/IM: CPT

## 2019-10-18 PROCEDURE — 99215 OFFICE O/P EST HI 40 MIN: CPT | Performed by: INTERNAL MEDICINE

## 2019-10-18 PROCEDURE — 96360 HYDRATION IV INFUSION INIT: CPT

## 2019-10-18 RX ORDER — MORPHINE SULFATE 30 MG/1
30 TABLET, FILM COATED, EXTENDED RELEASE ORAL EVERY 12 HOURS SCHEDULED
Qty: 60 TABLET | Refills: 0 | Status: SHIPPED | OUTPATIENT
Start: 2019-10-18 | End: 2019-11-11

## 2019-10-18 RX ORDER — SODIUM CHLORIDE 9 MG/ML
INJECTION, SOLUTION INTRAVENOUS ONCE
Status: COMPLETED | OUTPATIENT
Start: 2019-10-18 | End: 2019-10-18

## 2019-10-18 RX ADMIN — SODIUM CHLORIDE: 9 INJECTION, SOLUTION INTRAVENOUS at 12:28:00

## 2019-10-18 NOTE — PROGRESS NOTES
Palliative Care Follow Up Note     Patient Name: Chapo Eduardo   YOB: 1955   Medical Record Number: [id number]   CSN: [id number]   Date of visit: 10/8/2019     Chief Complaint/Reason for Visit:  Palliative follow up     History of [place] HISTORY  1982    nasal surgery   • OTHER SURGICAL HISTORY  03/28/2014    endo. bx   • SINUS SURGERY           Allergies:  No Known Allergies    Family History:  History reviewed. No pertinent family history.   Social History:  Social History    Socioeconomi (two) times daily. , Disp: 60 capsule, Rfl: 3  Ondansetron HCl (ZOFRAN) 4 mg tablet, , Disp: , Rfl: 3  ondansetron 8 MG Oral Tablet Dispersible, Take 1 tablet (8 mg total) by mouth every 8 (eight) hours as needed for Nausea., Disp: 30 tablet, Rfl: 0  Prochl and she is willing to try. She will benefit from an LA opiate to reduce amount of norco being used. Encouraged her to continue immodium to minimize diarrhea as this will help minimize the fatigue and weakness from dehydration.   She feels like she wou

## 2019-10-18 NOTE — PROGRESS NOTES
Education Record    Learner:  Patient    Disease / Diagnosis: iv fluids today and velcade    Barriers / Limitations:  None   Comments:    Method:  Brief focused   Comments:    General Topics:  Plan of care reviewed   Comments:    Outcome:  Shows understand

## 2019-10-20 NOTE — PROGRESS NOTES
Cancer Center Progress Note  Patient Name: Jeanne Gottlieb   YOB: 1955   Medical Record Number: [id number]   CSN: [id number]   Attending Physician: Vivienne Salazar M.D.        Date of Visit: 10/20/2019     Chief Complaint:  Patient presen History:   Diagnosis Date   • Asthma    • Atypical glandular cell changes cervix of undetermined significance favor dysplasia 02/12/2014    hpv+   • Encounter for screening for human papillomavirus (HPV) 10/08/2014    normal   • HPV in female    • [employer] organization: Not on file        Attends meetings of clubs or organizations: Not on file        Relationship status: Not on file      Intimate partner violence:        Fear of current or ex partner: Not on file        Emotionally abused: Not on file Complex Vitamins (VITAMIN B COMPLEX OR), Take 1 tablet by mouth daily. , Disp: , Rfl:   •  Estradiol-Norethindrone Acet (LOPREEZA) 1-0.5 MG Oral Tab, Take 1 tablet by mouth daily. , Disp: 90 tablet, Rfl: 3  •  morphINE Sulfate ER 30 MG Oral Tab CR, Take 1 no masses, ascites or hepatosplenomegaly. Extremities Normal - No visible deformities, no cyanosis, clubbing or edema.     Integumentary Normal - No rashes, No Jaundice   Neurologic No sensory or motor deficits, normal cerebellar function, cranial nerves

## 2019-10-21 ENCOUNTER — OFFICE VISIT (OUTPATIENT)
Dept: HEMATOLOGY/ONCOLOGY | Facility: HOSPITAL | Age: 64
End: 2019-10-21
Attending: INTERNAL MEDICINE
Payer: COMMERCIAL

## 2019-10-21 DIAGNOSIS — C90.00 MULTIPLE MYELOMA NOT HAVING ACHIEVED REMISSION (HCC): Primary | ICD-10-CM

## 2019-10-21 PROCEDURE — 96401 CHEMO ANTI-NEOPL SQ/IM: CPT

## 2019-10-21 NOTE — PROGRESS NOTES
Pt here for C2D4.   Arrives Ambulating independently, accompanied by Self and Family member           Patient denies possible pregnancy since last therapy cycle: Yes    Modifications in dose or schedule: No     Frequency of blood return and site check throu

## 2019-10-23 ENCOUNTER — TELEPHONE (OUTPATIENT)
Dept: HEMATOLOGY/ONCOLOGY | Facility: HOSPITAL | Age: 64
End: 2019-10-23

## 2019-10-23 NOTE — TELEPHONE ENCOUNTER
I spoke with [place]. Script is ready to be shipped. Pt should call for shipment. I left a message with the patient's daughter. I spoke with the patient and she will call the pharmacy.

## 2019-10-25 ENCOUNTER — OFFICE VISIT (OUTPATIENT)
Dept: HEMATOLOGY/ONCOLOGY | Facility: HOSPITAL | Age: 64
End: 2019-10-25
Attending: INTERNAL MEDICINE
Payer: COMMERCIAL

## 2019-10-25 VITALS
HEIGHT: 65 IN | TEMPERATURE: 98 F | OXYGEN SATURATION: 99 % | WEIGHT: 109.19 LBS | HEART RATE: 111 BPM | BODY MASS INDEX: 18.19 KG/M2 | DIASTOLIC BLOOD PRESSURE: 76 MMHG | SYSTOLIC BLOOD PRESSURE: 112 MMHG | RESPIRATION RATE: 18 BRPM

## 2019-10-25 DIAGNOSIS — C90.00 MULTIPLE MYELOMA NOT HAVING ACHIEVED REMISSION (HCC): Primary | ICD-10-CM

## 2019-10-25 PROCEDURE — 96401 CHEMO ANTI-NEOPL SQ/IM: CPT

## 2019-10-25 NOTE — PROGRESS NOTES
Pt here for C2D8.   Arrives Ambulating independently, accompanied by Family member           Patient denies possible pregnancy since last therapy cycle: No    Modifications in dose or schedule: No verified with Dr. Savana Chamorro - no labs needed today     Frequ

## 2019-10-28 ENCOUNTER — OFFICE VISIT (OUTPATIENT)
Dept: HEMATOLOGY/ONCOLOGY | Facility: HOSPITAL | Age: 64
End: 2019-10-28
Attending: INTERNAL MEDICINE
Payer: COMMERCIAL

## 2019-10-28 VITALS
HEART RATE: 124 BPM | BODY MASS INDEX: 18.56 KG/M2 | TEMPERATURE: 99 F | DIASTOLIC BLOOD PRESSURE: 75 MMHG | RESPIRATION RATE: 18 BRPM | OXYGEN SATURATION: 95 % | SYSTOLIC BLOOD PRESSURE: 126 MMHG | WEIGHT: 111.38 LBS | HEIGHT: 65 IN

## 2019-10-28 DIAGNOSIS — C90.00 MULTIPLE MYELOMA NOT HAVING ACHIEVED REMISSION (HCC): Primary | ICD-10-CM

## 2019-10-28 PROCEDURE — 82310 ASSAY OF CALCIUM: CPT

## 2019-10-28 PROCEDURE — 82565 ASSAY OF CREATININE: CPT

## 2019-10-28 PROCEDURE — 36415 COLL VENOUS BLD VENIPUNCTURE: CPT

## 2019-10-28 PROCEDURE — 96401 CHEMO ANTI-NEOPL SQ/IM: CPT

## 2019-10-28 PROCEDURE — 96374 THER/PROPH/DIAG INJ IV PUSH: CPT

## 2019-10-28 NOTE — PROGRESS NOTES
Pt here for C 2 D 11 velcade.   Arrives Via wheelchair, accompanied by Self and Family member           Patient denies possible pregnancy since last therapy cycle: Not Applicable    Modifications in dose or schedule: No     Frequency of blood return and sit

## 2019-11-04 DIAGNOSIS — C90.00 MULTIPLE MYELOMA NOT HAVING ACHIEVED REMISSION (HCC): ICD-10-CM

## 2019-11-06 RX ORDER — LENALIDOMIDE 25 MG/1
CAPSULE ORAL
Qty: 14 CAPSULE | Refills: 0 | Status: SHIPPED | OUTPATIENT
Start: 2019-11-06 | End: 2019-11-22

## 2019-11-08 ENCOUNTER — APPOINTMENT (OUTPATIENT)
Dept: HEMATOLOGY/ONCOLOGY | Facility: HOSPITAL | Age: 64
End: 2019-11-08
Attending: INTERNAL MEDICINE
Payer: COMMERCIAL

## 2019-11-08 VITALS
TEMPERATURE: 100 F | HEART RATE: 115 BPM | SYSTOLIC BLOOD PRESSURE: 138 MMHG | RESPIRATION RATE: 18 BRPM | HEIGHT: 65 IN | DIASTOLIC BLOOD PRESSURE: 85 MMHG | WEIGHT: 111.5 LBS | OXYGEN SATURATION: 97 % | BODY MASS INDEX: 18.58 KG/M2

## 2019-11-08 DIAGNOSIS — T45.1X5A ANEMIA ASSOCIATED WITH CHEMOTHERAPY: ICD-10-CM

## 2019-11-08 DIAGNOSIS — C90.00 MULTIPLE MYELOMA NOT HAVING ACHIEVED REMISSION (HCC): Primary | ICD-10-CM

## 2019-11-08 DIAGNOSIS — H53.2 DIPLOPIA: ICD-10-CM

## 2019-11-08 DIAGNOSIS — M89.9 LYTIC LESION OF BONE ON X-RAY: ICD-10-CM

## 2019-11-08 DIAGNOSIS — D64.81 ANEMIA ASSOCIATED WITH CHEMOTHERAPY: ICD-10-CM

## 2019-11-08 PROCEDURE — 96401 CHEMO ANTI-NEOPL SQ/IM: CPT

## 2019-11-08 PROCEDURE — 99214 OFFICE O/P EST MOD 30 MIN: CPT | Performed by: INTERNAL MEDICINE

## 2019-11-08 NOTE — PROGRESS NOTES
Pt here for C3D1.   Arrives Ambulating independently, accompanied by Family member           Patient denies possible pregnancy since last therapy cycle: No    Modifications in dose or schedule: No     Frequency of blood return and site check throughout admi

## 2019-11-08 NOTE — PROGRESS NOTES
CC:  Yusuf Aceves is here today for chest congestion.    Medications: medications verified and updated  Refills needed today?  NO  Patient would like communication of their results via:      Cell Phone:   Telephone Information:   Mobile [phone]     Okay to leave a message containing results? Yes  Tobacco history: verified    Pharmacy verified for this encounter 5/17/2017.  There are no preventive care reminders to display for this patient.  Patient is up to date, no discussion needed.    Patient's current [facility] status: Active.     Pt here for follow up and injection. She started revlimid cycle 3 today. .  She complains of an left eye infection -- red and crusty.

## 2019-11-11 ENCOUNTER — OFFICE VISIT (OUTPATIENT)
Dept: HEMATOLOGY/ONCOLOGY | Facility: HOSPITAL | Age: 64
End: 2019-11-11
Attending: INTERNAL MEDICINE
Payer: COMMERCIAL

## 2019-11-11 VITALS
BODY MASS INDEX: 18.39 KG/M2 | SYSTOLIC BLOOD PRESSURE: 124 MMHG | WEIGHT: 110.38 LBS | RESPIRATION RATE: 19 BRPM | DIASTOLIC BLOOD PRESSURE: 79 MMHG | TEMPERATURE: 99 F | OXYGEN SATURATION: 99 % | HEART RATE: 111 BPM | HEIGHT: 65 IN

## 2019-11-11 DIAGNOSIS — G89.3 NEOPLASM RELATED PAIN: Primary | ICD-10-CM

## 2019-11-11 DIAGNOSIS — C90.00 MULTIPLE MYELOMA NOT HAVING ACHIEVED REMISSION (HCC): Primary | ICD-10-CM

## 2019-11-11 DIAGNOSIS — K59.03 DRUG-INDUCED CONSTIPATION: ICD-10-CM

## 2019-11-11 PROCEDURE — 96401 CHEMO ANTI-NEOPL SQ/IM: CPT

## 2019-11-11 PROCEDURE — 99213 OFFICE O/P EST LOW 20 MIN: CPT | Performed by: NURSE PRACTITIONER

## 2019-11-11 RX ORDER — MORPHINE SULFATE 30 MG/1
30 TABLET, FILM COATED, EXTENDED RELEASE ORAL EVERY 12 HOURS SCHEDULED
Qty: 60 TABLET | Refills: 0 | Status: SHIPPED | OUTPATIENT
Start: 2019-11-11 | End: 2019-12-02

## 2019-11-11 NOTE — PROGRESS NOTES
Palliative Care Follow Up Note     Patient Name: Rosalie Villa   YOB: 1955   Medical Record Number: [id number]   CSN: [id number]   Date of visit: 11/11/2019     Chief Complaint/Reason for Visit:  Palliative follow up     History of Present I status: Single      Spouse name: Not on file      Number of children: Not on file      Years of education: Not on file      Highest education level: Not on file    Tobacco Use      Smoking status: Never Smoker      Smokeless tobacco: Never Used    [employer] Vitamins (VITAMIN B COMPLEX OR) Take 1 tablet by mouth daily. • Estradiol-Norethindrone Acet (LOPREEZA) 1-0.5 MG Oral Tab Take 1 tablet by mouth daily. 90 tablet 3       Review of Systems:  General:  Fatigue. Feels well.     Respiratory:  Denies SOB,

## 2019-11-11 NOTE — PROGRESS NOTES
Pt here for C4D4 of Velcade injection.   Arrives Ambulating independently, accompanied by Self and Family member           Patient denies possible pregnancy since last therapy cycle: Yes    Modifications in dose or schedule: No     Frequency of blood return

## 2019-11-15 ENCOUNTER — TELEPHONE (OUTPATIENT)
Dept: HEMATOLOGY/ONCOLOGY | Facility: HOSPITAL | Age: 64
End: 2019-11-15

## 2019-11-15 ENCOUNTER — OFFICE VISIT (OUTPATIENT)
Dept: HEMATOLOGY/ONCOLOGY | Facility: HOSPITAL | Age: 64
End: 2019-11-15
Attending: INTERNAL MEDICINE
Payer: COMMERCIAL

## 2019-11-15 ENCOUNTER — SOCIAL WORK SERVICES (OUTPATIENT)
Dept: HEMATOLOGY/ONCOLOGY | Facility: HOSPITAL | Age: 64
End: 2019-11-15

## 2019-11-15 VITALS
SYSTOLIC BLOOD PRESSURE: 137 MMHG | WEIGHT: 109.38 LBS | OXYGEN SATURATION: 97 % | TEMPERATURE: 99 F | BODY MASS INDEX: 18.22 KG/M2 | DIASTOLIC BLOOD PRESSURE: 77 MMHG | RESPIRATION RATE: 16 BRPM | HEIGHT: 65 IN | HEART RATE: 104 BPM

## 2019-11-15 DIAGNOSIS — C90.00 MULTIPLE MYELOMA NOT HAVING ACHIEVED REMISSION (HCC): Primary | ICD-10-CM

## 2019-11-15 PROCEDURE — 96401 CHEMO ANTI-NEOPL SQ/IM: CPT

## 2019-11-15 NOTE — TELEPHONE ENCOUNTER
Patient's left eye has been irritated underneath eyelid; has been bothering her for a couple weeks; MD has been aware. Let MD know it is still bothering her regardless of frequent warm compresses.  MD stated that patient should go see her eye doctor - and h

## 2019-11-15 NOTE — PROGRESS NOTES
Pt here for C3D8 velcade inj .   Arrives Ambulating independently, accompanied by Self           Patient denies possible pregnancy since last therapy cycle: Not Applicable    Modifications in dose or schedule: No     Frequency of blood return and site check

## 2019-11-18 ENCOUNTER — OFFICE VISIT (OUTPATIENT)
Dept: HEMATOLOGY/ONCOLOGY | Facility: HOSPITAL | Age: 64
End: 2019-11-18
Attending: INTERNAL MEDICINE
Payer: COMMERCIAL

## 2019-11-18 DIAGNOSIS — C90.00 MULTIPLE MYELOMA NOT HAVING ACHIEVED REMISSION (HCC): Primary | ICD-10-CM

## 2019-11-18 PROCEDURE — 83883 ASSAY NEPHELOMETRY NOT SPEC: CPT

## 2019-11-18 PROCEDURE — 36415 COLL VENOUS BLD VENIPUNCTURE: CPT

## 2019-11-18 PROCEDURE — 86334 IMMUNOFIX E-PHORESIS SERUM: CPT

## 2019-11-18 PROCEDURE — 84165 PROTEIN E-PHORESIS SERUM: CPT

## 2019-11-18 PROCEDURE — 96401 CHEMO ANTI-NEOPL SQ/IM: CPT

## 2019-11-18 NOTE — PROGRESS NOTES
Pt here for C3D11.   Arrives Ambulating independently, accompanied by Friend           Patient denies possible pregnancy since last therapy cycle: Not Applicable    Modifications in dose or schedule: No     Frequency of blood return and site check throughou

## 2019-11-21 NOTE — PROGRESS NOTES
Cancer Center Progress Note  Patient Name: Rajendra Sainz   YOB: 1955   Medical Record Number: [id number]   CSN: [id number]   Attending Physician: Magan Stone M.D.        Date of Visit: 11/8/19     Chief Complaint:  Patient presents glandular cell changes cervix of undetermined significance favor dysplasia 02/12/2014    hpv+   • Encounter for screening for human papillomavirus (HPV) 10/08/2014    normal   • HPV in female    • Von Willebrand disease ([facility] [address]        Past Surgical History: organizations: Not on file        Relationship status: Not on file      Intimate partner violence:        Fear of current or ex partner: Not on file        Emotionally abused: Not on file        Physically abused: Not on file        Forced sexual activity: (COMPAZINE) 10 mg tablet, Take 1 tablet (10 mg total) by mouth every 6 (six) hours as needed for Nausea., Disp: 30 tablet, Rfl: 3  •  Ondansetron HCl (ZOFRAN) 8 MG tablet, Take 1 tablet (8 mg total) by mouth every 8 (eight) hours as needed for Nausea., Dis Extremities Normal - No cyanosis, clubbing or edema. Integumentary Normal - No rashes and noJaundice   Neurologic Normal - No sensory or motor deficits, normal cerebellar function, normal gait, cranial nerves intact. Psychiatric Normal - A&Ox3.  Cohe Range: 0.68 - 1.23 g/dL  2.09 (H)   GAMMA GLOBULINS Latest Ref Range: 0.62 - 1.70 g/dL  0.13 (L)   ALBUMIN/GLOBULIN RATIO Latest Ref Range: 1.00 - 2.00   1.05   M-Fuad Latest Ref Range: <=0.00 g/dL  1.40 (H)   SPE INTERPRETATION Unknown  Monoclonal spike neoplasm, and chemo: Adequate to proceed with cycle 3. No transfusion needed. 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 Improving. Lytic bone lesions: Continue zometa    Planned Follow Up:  3 weeks.     I spent * minutes f

## 2019-11-22 DIAGNOSIS — C90.00 MULTIPLE MYELOMA NOT HAVING ACHIEVED REMISSION (HCC): ICD-10-CM

## 2019-11-22 RX ORDER — LENALIDOMIDE 25 MG/1
CAPSULE ORAL
Qty: 14 CAPSULE | Refills: 0 | Status: SHIPPED | OUTPATIENT
Start: 2019-11-22 | End: 2019-12-06

## 2019-11-26 DIAGNOSIS — C90.00 MULTIPLE MYELOMA NOT HAVING ACHIEVED REMISSION (HCC): ICD-10-CM

## 2019-11-26 RX ORDER — ACYCLOVIR 200 MG/1
CAPSULE ORAL
Qty: 60 CAPSULE | Refills: 2 | Status: SHIPPED | OUTPATIENT
Start: 2019-11-26 | End: 2020-01-06

## 2019-11-29 ENCOUNTER — OFFICE VISIT (OUTPATIENT)
Dept: HEMATOLOGY/ONCOLOGY | Facility: HOSPITAL | Age: 64
End: 2019-11-29
Attending: INTERNAL MEDICINE
Payer: COMMERCIAL

## 2019-11-29 VITALS
BODY MASS INDEX: 18.49 KG/M2 | TEMPERATURE: 99 F | OXYGEN SATURATION: 97 % | HEART RATE: 117 BPM | RESPIRATION RATE: 16 BRPM | SYSTOLIC BLOOD PRESSURE: 135 MMHG | DIASTOLIC BLOOD PRESSURE: 83 MMHG | WEIGHT: 111 LBS | HEIGHT: 65 IN

## 2019-11-29 DIAGNOSIS — C90.00 MULTIPLE MYELOMA NOT HAVING ACHIEVED REMISSION (HCC): Primary | ICD-10-CM

## 2019-11-29 PROCEDURE — 99213 OFFICE O/P EST LOW 20 MIN: CPT | Performed by: NURSE PRACTITIONER

## 2019-11-29 PROCEDURE — 96401 CHEMO ANTI-NEOPL SQ/IM: CPT

## 2019-11-29 PROCEDURE — 96374 THER/PROPH/DIAG INJ IV PUSH: CPT

## 2019-11-29 RX ORDER — NEOMYCIN SULFATE, POLYMYXIN B SULFATE AND DEXAMETHASONE 3.5; 10000; 1 MG/ML; [USP'U]/ML; MG/ML
1 SUSPENSION/ DROPS OPHTHALMIC
COMMUNITY
Start: 2019-11-26 | End: 2019-12-06

## 2019-11-29 RX ORDER — DOXYCYCLINE HYCLATE 100 MG/1
50 CAPSULE ORAL DAILY
COMMUNITY
Start: 2019-11-27

## 2019-11-29 NOTE — PROGRESS NOTES
Pt here for C4D1.   Arrives Ambulating independently, accompanied by Family member           Patient denies possible pregnancy since last therapy cycle: No    Modifications in dose or schedule: No     Frequency of blood return and site check throughout admi

## 2019-11-29 NOTE — PROGRESS NOTES
Patient presents with: Follow - Up: APN assessment prior to treatment    Pt is here for treatment; C4 D1 velcade is expected. Pt has a left eye infection that is being treated with antibiotics and eye drops - pt reports this is improving.  She is gaining s

## 2019-12-02 ENCOUNTER — OFFICE VISIT (OUTPATIENT)
Dept: HEMATOLOGY/ONCOLOGY | Facility: HOSPITAL | Age: 64
End: 2019-12-02
Attending: INTERNAL MEDICINE
Payer: COMMERCIAL

## 2019-12-02 VITALS
HEART RATE: 107 BPM | DIASTOLIC BLOOD PRESSURE: 68 MMHG | BODY MASS INDEX: 18.63 KG/M2 | RESPIRATION RATE: 16 BRPM | OXYGEN SATURATION: 99 % | HEIGHT: 65 IN | TEMPERATURE: 99 F | SYSTOLIC BLOOD PRESSURE: 126 MMHG | WEIGHT: 111.81 LBS

## 2019-12-02 DIAGNOSIS — K59.03 DRUG-INDUCED CONSTIPATION: ICD-10-CM

## 2019-12-02 DIAGNOSIS — C90.00 MULTIPLE MYELOMA NOT HAVING ACHIEVED REMISSION (HCC): Primary | ICD-10-CM

## 2019-12-02 DIAGNOSIS — G89.3 NEOPLASM RELATED PAIN: Primary | ICD-10-CM

## 2019-12-02 PROCEDURE — 99213 OFFICE O/P EST LOW 20 MIN: CPT | Performed by: NURSE PRACTITIONER

## 2019-12-02 PROCEDURE — 96401 CHEMO ANTI-NEOPL SQ/IM: CPT

## 2019-12-02 RX ORDER — MORPHINE SULFATE 30 MG/1
30 TABLET, FILM COATED, EXTENDED RELEASE ORAL EVERY 12 HOURS SCHEDULED
Qty: 60 TABLET | Refills: 0 | Status: SHIPPED | OUTPATIENT
Start: 2019-12-11 | End: 2020-01-20

## 2019-12-02 NOTE — PROGRESS NOTES
Pt here for C4,D4 Velcade inj. Arrives Ambulating independently, accompanied by Self           Patient says she feels great, no issues at this time.     Patient denies possible pregnancy since last therapy cycle: Not Applicable    Modifications in dose or

## 2019-12-02 NOTE — PROGRESS NOTES
Palliative Care Follow Up Note     Patient Name: Placido Meadows   YOB: 1955   Medical Record Number: [id number]   CSN: [id number]   Date of visit: 11/11/2019     Chief Complaint/Reason for Visit:  Palliative follow up     History of Present I Marital status: Single      Spouse name: Not on file      Number of children: Not on file      Years of education: Not on file      Highest education level: Not on file    Tobacco Use      Smoking status: Never Smoker      Smokeless tobacco: Never Used cetirizine 10 MG Oral Tab Take 10 mg by mouth daily. • BIOTIN OR Take by mouth. • Ergocalciferol (VITAMIN D OR) Take by mouth. • B Complex Vitamins (VITAMIN B COMPLEX OR) Take 1 tablet by mouth daily.        • Estradiol-Norethindrone Acet (Fina Clarkles

## 2019-12-05 NOTE — PROGRESS NOTES
ANP Visit Note    Patient Name: Nick Conklin   YOB: 1955   Medical Record Number: [id number]   CSN: [id number]   Date of visit: 11/29/19    Chief Complaint/Reason for Visit:  Patient presents with:   Follow - Up: APN assessment prior to paul file      Number of children: Not on file      Years of education: Not on file      Highest education level: Not on file    Occupational History      Not on file    Social Needs      Financial resource strain: Not on file      Food insecurity:        Worry ON DAYS 1-14 EVERY 21 DAY CYCLE, Disp: 14 capsule, Rfl: 0  •  HYDROcodone-acetaminophen (NORCO)  MG Oral Tab, Take 1-2 tablets by mouth every 4 (four) hours as needed for Pain., Disp: 240 tablet, Rfl: 0  •  dexamethasone (DECADRON) 4 MG tablet, Take adult)   Pulse 117   Temp 98.9 °F (37.2 °C) (Tympanic)   Resp 16   Ht 1.651 m (5' 5\")   Wt 50.3 kg (111 lb)   SpO2 97%   BMI 18.47 kg/m²   HEENT: Left eye erythema and clear drainage  Chest: Clear to auscultation. Heart: Regular rate and rhythm.    Abdome Ref Range: 150.0 - 450.0 10(3)uL 315.0   RBC Latest Ref Range: 3.80 - 5.30 x10(6)uL 3.41 (L)   MCH Latest Ref Range: 26.0 - 34.0 pg 30.2   MCHC Latest Ref Range: 31.0 - 37.0 g/dL 32.2   MCV Latest Ref Range: 80.0 - 100.0 fL 93.8   RDW Latest Ref Range: 11. [facility]

## 2019-12-06 ENCOUNTER — OFFICE VISIT (OUTPATIENT)
Dept: HEMATOLOGY/ONCOLOGY | Facility: HOSPITAL | Age: 64
End: 2019-12-06
Attending: INTERNAL MEDICINE
Payer: COMMERCIAL

## 2019-12-06 DIAGNOSIS — C90.00 MULTIPLE MYELOMA NOT HAVING ACHIEVED REMISSION (HCC): Primary | ICD-10-CM

## 2019-12-06 DIAGNOSIS — C90.00 MULTIPLE MYELOMA NOT HAVING ACHIEVED REMISSION (HCC): ICD-10-CM

## 2019-12-06 PROCEDURE — 96401 CHEMO ANTI-NEOPL SQ/IM: CPT

## 2019-12-06 RX ORDER — LENALIDOMIDE 25 MG/1
CAPSULE ORAL
Qty: 14 CAPSULE | Status: SHIPPED | OUTPATIENT
Start: 2019-12-06 | End: 2019-12-26

## 2019-12-06 NOTE — PROGRESS NOTES
Pt here for C4D8.   Arrives Ambulating independently, accompanied by Self           Patient reports possible pregnancy since last therapy cycle: Not Applicable    Modifications in dose or schedule: No     Frequency of blood return and site check throughout

## 2019-12-09 ENCOUNTER — OFFICE VISIT (OUTPATIENT)
Dept: HEMATOLOGY/ONCOLOGY | Facility: HOSPITAL | Age: 64
End: 2019-12-09
Attending: INTERNAL MEDICINE
Payer: COMMERCIAL

## 2019-12-09 VITALS
HEART RATE: 112 BPM | BODY MASS INDEX: 18.27 KG/M2 | RESPIRATION RATE: 16 BRPM | DIASTOLIC BLOOD PRESSURE: 80 MMHG | WEIGHT: 109.63 LBS | OXYGEN SATURATION: 98 % | HEIGHT: 65 IN | SYSTOLIC BLOOD PRESSURE: 133 MMHG | TEMPERATURE: 98 F

## 2019-12-09 DIAGNOSIS — C90.00 MULTIPLE MYELOMA NOT HAVING ACHIEVED REMISSION (HCC): Primary | ICD-10-CM

## 2019-12-09 PROCEDURE — 96401 CHEMO ANTI-NEOPL SQ/IM: CPT

## 2019-12-09 NOTE — PROGRESS NOTES
Pt here for C 4 D 11 velcade.   Arrives Ambulating independently, accompanied by Self           Patient reports possible pregnancy since last therapy cycle: Yes    Modifications in dose or schedule: No     Frequency of blood return and site check throughout

## 2019-12-20 ENCOUNTER — OFFICE VISIT (OUTPATIENT)
Dept: HEMATOLOGY/ONCOLOGY | Facility: HOSPITAL | Age: 64
End: 2019-12-20
Attending: INTERNAL MEDICINE
Payer: COMMERCIAL

## 2019-12-20 ENCOUNTER — HOSPITAL ENCOUNTER (OUTPATIENT)
Dept: ULTRASOUND IMAGING | Facility: HOSPITAL | Age: 64
Discharge: HOME | End: 2019-12-20
Attending: INTERNAL MEDICINE
Payer: COMMERCIAL

## 2019-12-20 VITALS
SYSTOLIC BLOOD PRESSURE: 134 MMHG | OXYGEN SATURATION: 98 % | BODY MASS INDEX: 18.49 KG/M2 | TEMPERATURE: 99 F | WEIGHT: 111 LBS | HEART RATE: 112 BPM | HEIGHT: 65 IN | RESPIRATION RATE: 18 BRPM | DIASTOLIC BLOOD PRESSURE: 85 MMHG

## 2019-12-20 DIAGNOSIS — C90.00 MULTIPLE MYELOMA NOT HAVING ACHIEVED REMISSION (HCC): Primary | ICD-10-CM

## 2019-12-20 DIAGNOSIS — H00.016 HORDEOLUM EXTERNUM OF LEFT EYE, UNSPECIFIED EYELID: ICD-10-CM

## 2019-12-20 DIAGNOSIS — M89.9 LYTIC BONE LESIONS ON XRAY: ICD-10-CM

## 2019-12-20 DIAGNOSIS — C90.00 MULTIPLE MYELOMA NOT HAVING ACHIEVED REMISSION (HCC): ICD-10-CM

## 2019-12-20 DIAGNOSIS — R60.0 BILATERAL LEG EDEMA: ICD-10-CM

## 2019-12-20 DIAGNOSIS — L97.421 SKIN ULCER OF LEFT HEEL, LIMITED TO BREAKDOWN OF SKIN (HCC): ICD-10-CM

## 2019-12-20 PROCEDURE — 96401 CHEMO ANTI-NEOPL SQ/IM: CPT

## 2019-12-20 PROCEDURE — 93970 EXTREMITY STUDY: CPT | Performed by: INTERNAL MEDICINE

## 2019-12-20 PROCEDURE — 36415 COLL VENOUS BLD VENIPUNCTURE: CPT

## 2019-12-20 PROCEDURE — 99215 OFFICE O/P EST HI 40 MIN: CPT | Performed by: INTERNAL MEDICINE

## 2019-12-20 RX ORDER — FUROSEMIDE 20 MG/1
20 TABLET ORAL DAILY PRN
Qty: 30 TABLET | Refills: 1 | Status: SHIPPED | OUTPATIENT
Start: 2019-12-20

## 2019-12-20 NOTE — PROGRESS NOTES
Pt here for C5D1 Velcade.   Arrives Ambulating independently, accompanied by Self           Patient reports possible pregnancy since last therapy cycle: Not Applicable    Modifications in dose or schedule: No     Frequency of blood return and site check thr

## 2019-12-20 NOTE — PROGRESS NOTES
Cancer Center Progress Note  Patient Name: Jairon Bautista   YOB: 1955   Medical Record Number: [id number]   CSN: [id number]   Attending Physician: Juan C Christianson M.D.        Date of Visit: 12/20/2019     Chief Complaint:  Patient presen undetermined significance favor dysplasia 02/12/2014    hpv+   • Encounter for screening for human papillomavirus (HPV) 10/08/2014    normal   • HPV in female    • Von Willebrand disease [facility])        Past Surgical History:  Past Surgical History:   Procedu Relationship status: Not on file      Intimate partner violence:        Fear of current or ex partner: Not on file        Emotionally abused: Not on file        Physically abused: Not on file        Forced sexual activity: Not on file    Other Topics (VITAMIN B COMPLEX OR), Take 1 tablet by mouth daily. , Disp: , Rfl:   •  Estradiol-Norethindrone Acet (LOPREEZA) 1-0.5 MG Oral Tab, Take 1 tablet by mouth daily. , Disp: 90 tablet, Rfl: 3    Allergies:  No Known Allergies     Review of Systems:    Constit Jaundice   Neurologic Normal - No sensory or motor deficits, normal cerebellar function, normal gait, cranial nerves intact. Psychiatric Normal - A&Ox3, Coherent speech. Verbalizes understanding of our discussions today.          Laboratory:  Results for Latest Ref Range: 0.48 - 1.05 g/dL 1.02      BETA GLOBULINS Latest Ref Range: 0.68 - 1.23 g/dL 2.09 (H)      GAMMA GLOBULINS Latest Ref Range: 0.62 - 1.70 g/dL 0.13 (L)      ALBUMIN/GLOBULIN RATIO Latest Ref Range: 1.00 - 2.00  1.05      M-Fuad Latest Ref reviewed, normal WBC, RBC, and platelet morphology. Radiology:    Pathology:    Impression and Plan:  Multiple myeloma: the patient is responding well to VRD. Her diplopia has resolved. She has tolerated 4 cycles of VRD and is responding well.  She me

## 2019-12-20 NOTE — PROGRESS NOTES
Outpatient Oncology Care Plan  Problem list:  appetite is better, L>R leg edema, sore on left heal - it can get red and hot to touch x6 weeks, right hand has 2 red spots x6 weeks.     Problems related to:    chemotherapy  disease/disease progression    Inte

## 2019-12-23 ENCOUNTER — OFFICE VISIT (OUTPATIENT)
Dept: HEMATOLOGY/ONCOLOGY | Facility: HOSPITAL | Age: 64
End: 2019-12-23
Attending: INTERNAL MEDICINE
Payer: COMMERCIAL

## 2019-12-23 VITALS
BODY MASS INDEX: 18.49 KG/M2 | TEMPERATURE: 99 F | RESPIRATION RATE: 16 BRPM | HEART RATE: 114 BPM | OXYGEN SATURATION: 99 % | HEIGHT: 65 IN | WEIGHT: 111 LBS | DIASTOLIC BLOOD PRESSURE: 87 MMHG | SYSTOLIC BLOOD PRESSURE: 145 MMHG

## 2019-12-23 DIAGNOSIS — C90.00 MULTIPLE MYELOMA NOT HAVING ACHIEVED REMISSION (HCC): Primary | ICD-10-CM

## 2019-12-23 PROCEDURE — 96401 CHEMO ANTI-NEOPL SQ/IM: CPT

## 2019-12-23 NOTE — PROGRESS NOTES
Pt here for C5D4.   Arrives Ambulating independently, accompanied by Self           Patient reports possible pregnancy since last therapy cycle: no    Modifications in dose or schedule: No     Frequency of blood return and site check throughout administrati

## 2019-12-26 DIAGNOSIS — C90.00 MULTIPLE MYELOMA NOT HAVING ACHIEVED REMISSION (HCC): ICD-10-CM

## 2019-12-26 RX ORDER — LENALIDOMIDE 25 MG/1
CAPSULE ORAL
Qty: 14 CAPSULE | Refills: 0 | Status: SHIPPED | OUTPATIENT
Start: 2019-12-26 | End: 2020-01-31 | Stop reason: ALTCHOICE

## 2019-12-27 ENCOUNTER — OFFICE VISIT (OUTPATIENT)
Dept: HEMATOLOGY/ONCOLOGY | Facility: HOSPITAL | Age: 64
End: 2019-12-27
Attending: INTERNAL MEDICINE
Payer: COMMERCIAL

## 2019-12-27 VITALS
OXYGEN SATURATION: 99 % | DIASTOLIC BLOOD PRESSURE: 81 MMHG | SYSTOLIC BLOOD PRESSURE: 133 MMHG | RESPIRATION RATE: 18 BRPM | WEIGHT: 107.63 LBS | TEMPERATURE: 99 F | HEART RATE: 116 BPM | BODY MASS INDEX: 17.93 KG/M2 | HEIGHT: 65 IN

## 2019-12-27 DIAGNOSIS — C90.00 MULTIPLE MYELOMA NOT HAVING ACHIEVED REMISSION (HCC): Primary | ICD-10-CM

## 2019-12-27 PROCEDURE — 96401 CHEMO ANTI-NEOPL SQ/IM: CPT

## 2019-12-27 NOTE — PROGRESS NOTES
Pt here for C5D8.   Arrives Ambulating independently, accompanied by Self           Patient reports possible pregnancy since last therapy cycle: No    Modifications in dose or schedule: No     Frequency of blood return and site check throughout administrati

## 2019-12-30 ENCOUNTER — OFFICE VISIT (OUTPATIENT)
Dept: HEMATOLOGY/ONCOLOGY | Facility: HOSPITAL | Age: 64
End: 2019-12-30
Attending: INTERNAL MEDICINE
Payer: COMMERCIAL

## 2019-12-30 VITALS
BODY MASS INDEX: 18.33 KG/M2 | DIASTOLIC BLOOD PRESSURE: 83 MMHG | OXYGEN SATURATION: 99 % | WEIGHT: 110 LBS | HEART RATE: 113 BPM | HEIGHT: 65 IN | TEMPERATURE: 99 F | RESPIRATION RATE: 18 BRPM | SYSTOLIC BLOOD PRESSURE: 162 MMHG

## 2019-12-30 DIAGNOSIS — C90.00 MULTIPLE MYELOMA NOT HAVING ACHIEVED REMISSION (HCC): Primary | ICD-10-CM

## 2019-12-30 PROCEDURE — 36415 COLL VENOUS BLD VENIPUNCTURE: CPT

## 2019-12-30 PROCEDURE — 82565 ASSAY OF CREATININE: CPT

## 2019-12-30 PROCEDURE — 96401 CHEMO ANTI-NEOPL SQ/IM: CPT

## 2019-12-30 PROCEDURE — 82310 ASSAY OF CALCIUM: CPT

## 2019-12-30 PROCEDURE — 96374 THER/PROPH/DIAG INJ IV PUSH: CPT

## 2019-12-30 NOTE — PROGRESS NOTES
Pt here for C5D11.   Arrives Ambulating independently, accompanied by Self           Patient reports possible pregnancy since last therapy cycle: Not Applicable    Modifications in dose or schedule: No     Frequency of blood return and site check throughout

## 2020-01-06 DIAGNOSIS — C90.00 MULTIPLE MYELOMA NOT HAVING ACHIEVED REMISSION (HCC): ICD-10-CM

## 2020-01-06 RX ORDER — ACYCLOVIR 200 MG/1
CAPSULE ORAL
Qty: 60 CAPSULE | Refills: 0 | Status: SHIPPED | OUTPATIENT
Start: 2020-01-06 | End: 2020-01-24

## 2020-01-10 ENCOUNTER — OFFICE VISIT (OUTPATIENT)
Dept: HEMATOLOGY/ONCOLOGY | Facility: HOSPITAL | Age: 65
End: 2020-01-10
Attending: INTERNAL MEDICINE
Payer: COMMERCIAL

## 2020-01-10 VITALS
BODY MASS INDEX: 18.41 KG/M2 | HEIGHT: 65 IN | SYSTOLIC BLOOD PRESSURE: 136 MMHG | RESPIRATION RATE: 18 BRPM | TEMPERATURE: 99 F | WEIGHT: 110.5 LBS | HEART RATE: 119 BPM | DIASTOLIC BLOOD PRESSURE: 82 MMHG | OXYGEN SATURATION: 98 %

## 2020-01-10 DIAGNOSIS — M89.9 LYTIC BONE LESIONS ON XRAY: ICD-10-CM

## 2020-01-10 DIAGNOSIS — G89.3 NEOPLASM RELATED PAIN: Primary | ICD-10-CM

## 2020-01-10 DIAGNOSIS — H00.011 HORDEOLUM EXTERNUM OF RIGHT UPPER EYELID: ICD-10-CM

## 2020-01-10 DIAGNOSIS — C90.00 MULTIPLE MYELOMA NOT HAVING ACHIEVED REMISSION (HCC): Primary | ICD-10-CM

## 2020-01-10 DIAGNOSIS — D63.0 ANEMIA IN NEOPLASTIC DISEASE: ICD-10-CM

## 2020-01-10 LAB
ALBUMIN SERPL-MCNC: 3.3 G/DL (ref 3.4–5)
ALBUMIN/GLOB SERPL: 0.7 {RATIO} (ref 1–2)
ALP LIVER SERPL-CCNC: 85 U/L (ref 50–130)
ALT SERPL-CCNC: 18 U/L (ref 13–56)
ANION GAP SERPL CALC-SCNC: 6 MMOL/L (ref 0–18)
AST SERPL-CCNC: 22 U/L (ref 15–37)
BASOPHILS # BLD AUTO: 0.01 X10(3) UL (ref 0–0.2)
BASOPHILS NFR BLD AUTO: 0.2 %
BILIRUB SERPL-MCNC: 0.4 MG/DL (ref 0.1–2)
BUN BLD-MCNC: 14 MG/DL (ref 7–18)
BUN/CREAT SERPL: 26.4 (ref 10–20)
CALCIUM BLD-MCNC: 9.1 MG/DL (ref 8.5–10.1)
CHLORIDE SERPL-SCNC: 110 MMOL/L (ref 98–112)
CO2 SERPL-SCNC: 23 MMOL/L (ref 21–32)
CREAT BLD-MCNC: 0.53 MG/DL (ref 0.55–1.02)
DEPRECATED RDW RBC AUTO: 52.1 FL (ref 35.1–46.3)
EOSINOPHIL # BLD AUTO: 0 X10(3) UL (ref 0–0.7)
EOSINOPHIL NFR BLD AUTO: 0 %
ERYTHROCYTE [DISTWIDTH] IN BLOOD BY AUTOMATED COUNT: 15 % (ref 11–15)
GLOBULIN PLAS-MCNC: 4.6 G/DL (ref 2.8–4.4)
GLUCOSE BLD-MCNC: 174 MG/DL (ref 70–99)
HCT VFR BLD AUTO: 34.4 % (ref 35–48)
HGB BLD-MCNC: 11.1 G/DL (ref 12–16)
IMM GRANULOCYTES # BLD AUTO: 0.01 X10(3) UL (ref 0–1)
IMM GRANULOCYTES NFR BLD: 0.2 %
LYMPHOCYTES # BLD AUTO: 0.26 X10(3) UL (ref 1–4)
LYMPHOCYTES NFR BLD AUTO: 5.5 %
M PROTEIN MFR SERPL ELPH: 7.9 G/DL (ref 6.4–8.2)
MCH RBC QN AUTO: 30.5 PG (ref 26–34)
MCHC RBC AUTO-ENTMCNC: 32.3 G/DL (ref 31–37)
MCV RBC AUTO: 94.5 FL (ref 80–100)
MONOCYTES # BLD AUTO: 0.03 X10(3) UL (ref 0.1–1)
MONOCYTES NFR BLD AUTO: 0.6 %
NEUTROPHILS # BLD AUTO: 4.41 X10 (3) UL (ref 1.5–7.7)
NEUTROPHILS # BLD AUTO: 4.41 X10(3) UL (ref 1.5–7.7)
NEUTROPHILS NFR BLD AUTO: 93.5 %
OSMOLALITY SERPL CALC.SUM OF ELEC: 293 MOSM/KG (ref 275–295)
PATIENT FASTING Y/N/NP: NO
PLATELET # BLD AUTO: 352 10(3)UL (ref 150–450)
POTASSIUM SERPL-SCNC: 3.7 MMOL/L (ref 3.5–5.1)
RBC # BLD AUTO: 3.64 X10(6)UL (ref 3.8–5.3)
SODIUM SERPL-SCNC: 139 MMOL/L (ref 136–145)
WBC # BLD AUTO: 4.7 X10(3) UL (ref 4–11)

## 2020-01-10 PROCEDURE — 99214 OFFICE O/P EST MOD 30 MIN: CPT | Performed by: INTERNAL MEDICINE

## 2020-01-10 PROCEDURE — 99213 OFFICE O/P EST LOW 20 MIN: CPT | Performed by: NURSE PRACTITIONER

## 2020-01-10 PROCEDURE — 96401 CHEMO ANTI-NEOPL SQ/IM: CPT

## 2020-01-10 RX ORDER — HYDROCODONE BITARTRATE AND ACETAMINOPHEN 10; 325 MG/1; MG/1
1 TABLET ORAL EVERY 4 HOURS PRN
Qty: 90 TABLET | Refills: 0 | Status: SHIPPED | OUTPATIENT
Start: 2020-01-10

## 2020-01-10 NOTE — PROGRESS NOTES
Pt here for velcade inj Arrives Ambulating independently, accompanied by Family member       Pt states she needs an appt for pl, dr Jen Otero for 1/31. Request sent; request still pending. Patient says she will check mychart.         Patient reports possibl

## 2020-01-11 NOTE — PROGRESS NOTES
Palliative Care Follow Up Note     Patient Name: Tesfaye Leahy   YOB: 1955   Medical Record Number: [id number]   CSN: [id number]   Date of visit: 1/10/2020     Chief Complaint/Reason for Visit:  Palliative follow up     History of Present [place] children: Not on file      Years of education: Not on file      Highest education level: Not on file    Tobacco Use      Smoking status: Never Smoker      Smokeless tobacco: Never Used    Substance and Sexual Activity      Alcohol use: Yes        Frequency COMPLEX OR) Take 1 tablet by mouth daily. • Estradiol-Norethindrone Acet (LOPREEZA) 1-0.5 MG Oral Tab Take 1 tablet by mouth daily. 90 tablet 3       Review of Systems:  General:  Fatigue. Feels well.     Respiratory:  Denies SOB, denies cough  Cardi

## 2020-01-13 ENCOUNTER — OFFICE VISIT (OUTPATIENT)
Dept: HEMATOLOGY/ONCOLOGY | Facility: HOSPITAL | Age: 65
End: 2020-01-13
Attending: INTERNAL MEDICINE
Payer: COMMERCIAL

## 2020-01-13 VITALS
HEART RATE: 111 BPM | WEIGHT: 112.63 LBS | OXYGEN SATURATION: 98 % | HEIGHT: 65 IN | SYSTOLIC BLOOD PRESSURE: 129 MMHG | DIASTOLIC BLOOD PRESSURE: 82 MMHG | RESPIRATION RATE: 18 BRPM | BODY MASS INDEX: 18.77 KG/M2 | TEMPERATURE: 99 F

## 2020-01-13 DIAGNOSIS — C90.00 MULTIPLE MYELOMA NOT HAVING ACHIEVED REMISSION (HCC): Primary | ICD-10-CM

## 2020-01-13 LAB
KAPPA FREE LIGHT CHAIN: 1.07 MG/DL (ref 0.33–1.94)
KAPPA/LAMBDA FLC RATIO: 5.47 (ref 0.26–1.65)
LAMBDA FREE LIGHT CHAIN: 0.2 MG/DL (ref 0.57–2.63)

## 2020-01-13 PROCEDURE — 96401 CHEMO ANTI-NEOPL SQ/IM: CPT

## 2020-01-13 NOTE — PROGRESS NOTES
Pt here for C6D4.   Arrives Ambulating independently, accompanied by Self           Patient reports possible pregnancy since last therapy cycle: Not Applicable    Modifications in dose or schedule: No     Frequency of blood return and site check throughout

## 2020-01-14 LAB
ALBUMIN SERPL ELPH-MCNC: 4.05 G/DL (ref 3.75–5.21)
ALBUMIN/GLOB SERPL: 1.17 {RATIO} (ref 1–2)
ALPHA1 GLOB SERPL ELPH-MCNC: 0.45 G/DL (ref 0.19–0.46)
ALPHA2 GLOB SERPL ELPH-MCNC: 1.04 G/DL (ref 0.48–1.05)
B-GLOBULIN SERPL ELPH-MCNC: 1.84 G/DL (ref 0.68–1.23)
GAMMA GLOB SERPL ELPH-MCNC: 0.13 G/DL (ref 0.62–1.7)
M PROTEIN MFR SERPL ELPH: 7.5 G/DL (ref 6.4–8.2)
M-SPIKE 1: 1.28 G/DL (ref ?–0)

## 2020-01-15 ENCOUNTER — APPOINTMENT (OUTPATIENT)
Dept: HEMATOLOGY/ONCOLOGY | Facility: HOSPITAL | Age: 65
End: 2020-01-15
Attending: INTERNAL MEDICINE
Payer: COMMERCIAL

## 2020-01-17 ENCOUNTER — OFFICE VISIT (OUTPATIENT)
Dept: HEMATOLOGY/ONCOLOGY | Facility: HOSPITAL | Age: 65
End: 2020-01-17
Attending: INTERNAL MEDICINE
Payer: COMMERCIAL

## 2020-01-17 VITALS
OXYGEN SATURATION: 98 % | BODY MASS INDEX: 18.6 KG/M2 | DIASTOLIC BLOOD PRESSURE: 79 MMHG | RESPIRATION RATE: 16 BRPM | WEIGHT: 111.63 LBS | SYSTOLIC BLOOD PRESSURE: 139 MMHG | HEIGHT: 65 IN | HEART RATE: 95 BPM | TEMPERATURE: 99 F

## 2020-01-17 DIAGNOSIS — C90.00 MULTIPLE MYELOMA NOT HAVING ACHIEVED REMISSION (HCC): Primary | ICD-10-CM

## 2020-01-17 PROCEDURE — 96401 CHEMO ANTI-NEOPL SQ/IM: CPT

## 2020-01-17 NOTE — PROGRESS NOTES
Pt here for C6D8 Velcade.   Arrives Ambulating independently, accompanied by Self           Patient reports possible pregnancy since last therapy cycle: Not Applicable    Modifications in dose or schedule: No     Frequency of blood return and site check thr

## 2020-01-20 ENCOUNTER — OFFICE VISIT (OUTPATIENT)
Dept: HEMATOLOGY/ONCOLOGY | Facility: HOSPITAL | Age: 65
End: 2020-01-20
Attending: INTERNAL MEDICINE
Payer: COMMERCIAL

## 2020-01-20 VITALS
TEMPERATURE: 99 F | OXYGEN SATURATION: 98 % | HEART RATE: 108 BPM | BODY MASS INDEX: 19 KG/M2 | RESPIRATION RATE: 18 BRPM | WEIGHT: 111.81 LBS | DIASTOLIC BLOOD PRESSURE: 65 MMHG | SYSTOLIC BLOOD PRESSURE: 135 MMHG

## 2020-01-20 DIAGNOSIS — C90.00 MULTIPLE MYELOMA NOT HAVING ACHIEVED REMISSION (HCC): Primary | ICD-10-CM

## 2020-01-20 PROCEDURE — 96401 CHEMO ANTI-NEOPL SQ/IM: CPT

## 2020-01-20 NOTE — PROGRESS NOTES
Pt here for C6D11.   Arrives Ambulating independently, accompanied by Self           Patient reports possible pregnancy since last therapy cycle: Not Applicable    Modifications in dose or schedule: No     Frequency of blood return and site check throughout

## 2020-01-21 ENCOUNTER — TELEPHONE (OUTPATIENT)
Dept: HEMATOLOGY/ONCOLOGY | Facility: HOSPITAL | Age: 65
End: 2020-01-21

## 2020-01-21 NOTE — TELEPHONE ENCOUNTER
C6 1/20/20  Velcade    Patient states she wanted to let MD know that she has experience some tingling in feet Sunday and Monday night and again this morning, post treatment. No other complaints per patient.

## 2020-01-23 DIAGNOSIS — C90.00 MULTIPLE MYELOMA NOT HAVING ACHIEVED REMISSION (HCC): ICD-10-CM

## 2020-01-23 RX ORDER — LENALIDOMIDE 25 MG/1
CAPSULE ORAL
Qty: 14 CAPSULE | Refills: 0 | OUTPATIENT
Start: 2020-01-23

## 2020-01-24 DIAGNOSIS — C90.00 MULTIPLE MYELOMA NOT HAVING ACHIEVED REMISSION (HCC): ICD-10-CM

## 2020-01-24 RX ORDER — ACYCLOVIR 200 MG/1
CAPSULE ORAL
Qty: 60 CAPSULE | Refills: 0 | Status: SHIPPED | OUTPATIENT
Start: 2020-01-24 | End: 2020-02-10

## 2020-01-27 ENCOUNTER — APPOINTMENT (OUTPATIENT)
Dept: HEMATOLOGY/ONCOLOGY | Facility: HOSPITAL | Age: 65
End: 2020-01-27
Attending: INTERNAL MEDICINE
Payer: COMMERCIAL

## 2020-01-28 ENCOUNTER — OFFICE VISIT (OUTPATIENT)
Dept: HEMATOLOGY/ONCOLOGY | Facility: HOSPITAL | Age: 65
End: 2020-01-28
Attending: INTERNAL MEDICINE
Payer: COMMERCIAL

## 2020-01-28 VITALS
OXYGEN SATURATION: 98 % | WEIGHT: 112.19 LBS | RESPIRATION RATE: 16 BRPM | DIASTOLIC BLOOD PRESSURE: 81 MMHG | BODY MASS INDEX: 18.69 KG/M2 | HEART RATE: 101 BPM | HEIGHT: 65 IN | TEMPERATURE: 99 F | SYSTOLIC BLOOD PRESSURE: 131 MMHG

## 2020-01-28 DIAGNOSIS — C90.00 MULTIPLE MYELOMA NOT HAVING ACHIEVED REMISSION (HCC): Primary | ICD-10-CM

## 2020-01-28 PROCEDURE — 96374 THER/PROPH/DIAG INJ IV PUSH: CPT

## 2020-01-28 PROCEDURE — 36415 COLL VENOUS BLD VENIPUNCTURE: CPT

## 2020-01-28 NOTE — PROGRESS NOTES
Education Record    Learner:  Patient    Disease / Diagnosis: multiple myeloma - zometa infusion.      Barriers / Limitations:  None   Comments:    Method:  Brief focused   Comments:    General Topics:  Plan of care reviewed   Comments:    Outcome:  Shows u

## 2020-01-31 ENCOUNTER — OFFICE VISIT (OUTPATIENT)
Dept: HEMATOLOGY/ONCOLOGY | Facility: HOSPITAL | Age: 65
End: 2020-01-31
Attending: INTERNAL MEDICINE
Payer: COMMERCIAL

## 2020-01-31 VITALS
DIASTOLIC BLOOD PRESSURE: 70 MMHG | BODY MASS INDEX: 18.83 KG/M2 | TEMPERATURE: 99 F | OXYGEN SATURATION: 99 % | HEIGHT: 65 IN | WEIGHT: 113 LBS | SYSTOLIC BLOOD PRESSURE: 146 MMHG | HEART RATE: 95 BPM | RESPIRATION RATE: 16 BRPM

## 2020-01-31 DIAGNOSIS — C90.00 MULTIPLE MYELOMA NOT HAVING ACHIEVED REMISSION (HCC): Primary | ICD-10-CM

## 2020-01-31 PROCEDURE — 99212 OFFICE O/P EST SF 10 MIN: CPT | Performed by: INTERNAL MEDICINE

## 2020-01-31 NOTE — PROGRESS NOTES
Cancer Center Progress Note  Patient Name: Diego Hampton   YOB: 1955   Medical Record Number: [id number]   CSN: [id number]   Attending Physician: Sallie Martini M.D.        Date of Visit: 1/31/2020     Chief Complaint:  Patient present human papillomavirus (HPV) 10/08/2014    normal   • HPV in female    • Von Willebrand disease [facility])        Past Surgical History:  Past Surgical History:   Procedure Laterality Date   • COLONOSCOPY  2005   • COLONOSCOPY, POSSIBLE BIOPSY, POSSIBLE POLYPECTO ex partner: Not on file        Emotionally abused: Not on file        Physically abused: Not on file        Forced sexual activity: Not on file    Other Topics      Concerns:        Caffeine Concern: Not Asked        Exercise: Not Asked        Seat Belt: N night sweats, excessive fatigue or weight loss. Eyes No significant visual difficulties. No diplopia. No yellowing. Hematologic/Lymphatic No easy bruising or bleeding. Denies tender or palpable lymph nodes.    Respiratory No dyspnea, pleuritic chest pa

## 2020-02-03 ENCOUNTER — APPOINTMENT (OUTPATIENT)
Dept: HEMATOLOGY/ONCOLOGY | Facility: HOSPITAL | Age: 65
End: 2020-02-03
Attending: INTERNAL MEDICINE
Payer: COMMERCIAL

## 2020-02-10 DIAGNOSIS — C90.00 MULTIPLE MYELOMA NOT HAVING ACHIEVED REMISSION (HCC): ICD-10-CM

## 2020-02-10 RX ORDER — ACYCLOVIR 200 MG/1
CAPSULE ORAL
Qty: 60 CAPSULE | Refills: 2 | Status: SHIPPED | OUTPATIENT
Start: 2020-02-10

## 2020-04-18 NOTE — DISCHARGE SUMMARY
[facility]    Discharge Summary    Jairon Bautista Patient Status:  Inpatient    1955 MRN [id number]   Location [facility] 4W/SW/SE Attending No att. providers found   Hosp Day # 5 PCP Manas Farmer,      [facility] next [facility]. Today, she was sent to [facility] on request of Dr. Wilder Sigala to have surgical intervention for her left hip for prophylactic pinning.     Hospital Course:     Lytic bone lesion of femur  -high risk for pathological fracture  -p

## 2020-05-11 ENCOUNTER — MED REC SCAN ONLY (OUTPATIENT)
Dept: INTERNAL MEDICINE CLINIC | Facility: CLINIC | Age: 65
End: 2020-05-11

## 2020-09-29 ENCOUNTER — TELEPHONE (OUTPATIENT)
Dept: INTERNAL MEDICINE CLINIC | Facility: CLINIC | Age: 65
End: 2020-09-29

## 2020-12-01 ENCOUNTER — MED REC SCAN ONLY (OUTPATIENT)
Dept: INTERNAL MEDICINE CLINIC | Facility: CLINIC | Age: 65
End: 2020-12-01

## 2020-12-11 ENCOUNTER — TELEPHONE (OUTPATIENT)
Dept: INTERNAL MEDICINE CLINIC | Facility: CLINIC | Age: 65
End: 2020-12-11

## 2020-12-11 NOTE — TELEPHONE ENCOUNTER
Received and abstracted covid-19 results from [address] [place] [address] for visit on 12/10/2020. Placed in AMS bin to review.

## 2020-12-20 LAB — AMB EXT COVID-19 RESULT: NOT DETECTED

## 2020-12-21 ENCOUNTER — TELEPHONE (OUTPATIENT)
Dept: INTERNAL MEDICINE CLINIC | Facility: CLINIC | Age: 65
End: 2020-12-21

## 2020-12-21 NOTE — TELEPHONE ENCOUNTER
Fax received from [address] [place] [address] for Covid 19 dated 12/20/20. Needs to be reviewed by AMS. Placed in Box. Sent to scan after review. Closing encounter.     abstracted

## 2020-12-28 ENCOUNTER — MED REC SCAN ONLY (OUTPATIENT)
Dept: INTERNAL MEDICINE CLINIC | Facility: CLINIC | Age: 65
End: 2020-12-28

## 2021-02-06 DIAGNOSIS — Z23 NEED FOR VACCINATION: ICD-10-CM

## (undated) DEVICE — ENCORE® LATEX ACCLAIM SIZE 8, STERILE LATEX POWDER-FREE SURGICAL GLOVE: Brand: ENCORE

## (undated) DEVICE — 3M™ COBAN™ NL STERILE NON-LATEX SELF-ADHERENT WRAP, 2084S, 4 IN X 5 YD (10 CM X 4,5 M), 18 ROLLS/CASE: Brand: 3M™ COBAN™

## (undated) DEVICE — 3M™ TEGADERM™ TRANSPARENT FILM DRESSING, 1626W, 4 IN X 4-3/4 IN (10 CM X 12 CM), 50 EACH/CARTON, 4 CARTON/CASE: Brand: 3M™ TEGADERM™

## (undated) DEVICE — DERMABOND LIQUID ADHESIVE

## (undated) DEVICE — SUTURE PDS II 1 CT-1

## (undated) DEVICE — GAUZE SPONGES,12 PLY: Brand: CURITY

## (undated) DEVICE — 450 ML BOTTLE OF 0.05% CHLORHEXIDINE GLUCONATE IN 99.95% STERILE WATER FOR IRRIGATION, USP AND APPLICATOR.: Brand: IRRISEPT ANTIMICROBIAL WOUND LAVAGE

## (undated) DEVICE — Device

## (undated) DEVICE — SUTURE VICRYL 2-0 SH

## (undated) DEVICE — BIT DRL GRN 145MM 4.2MM 3 FLT

## (undated) DEVICE — CONTAINER SPEC STR 4OZ GRY LID

## (undated) DEVICE — SUCTION CANISTER, 3000CC,SAFELINER: Brand: DEROYAL

## (undated) DEVICE — 6617 IOBAN II PATIENT ISOLATION DRAPE 5/BX,4BX/CS: Brand: STERI-DRAPE™ IOBAN™ 2

## (undated) DEVICE — DRESSING AQUACEL AG 3.5X3.75

## (undated) DEVICE — CHLORAPREP 26ML APPLICATOR

## (undated) DEVICE — HIP PINNING: Brand: MEDLINE INDUSTRIES, INC.

## (undated) DEVICE — PROXIMATE SKIN STAPLERS (35 WIDE) CONTAINS 35 STAINLESS STEEL STAPLES (FIXED HEAD): Brand: PROXIMATE

## (undated) DEVICE — WEBRIL COTTON UNDERCAST PADDING: Brand: WEBRIL

## (undated) DEVICE — GUIDEWIRE SYNT 3.2X400 357.399

## (undated) DEVICE — 3M™ IOBAN™ 2 ANTIMICROBIAL INCISE DRAPE 6650EZ: Brand: IOBAN™ 2

## (undated) DEVICE — DRESSING AQUACEL AG 3.5 X 6

## (undated) DEVICE — SOL  .9 1000ML BTL

## (undated) DEVICE — BATTERY

## (undated) DEVICE — ROD RM 950MM 2.5MM BALL TIP GW

## (undated) NOTE — LETTER
Printed: 2019    Patient Name: Placido Meadows  : 1955   Medical Record #: [id number]    Consent to [address], understand that I have been diagnosed with multiple myeloma.     I understand that the treatment sug questions have been answered to my satisfaction. I understand that I can contact my oncologist, or my Cancer Care Team at any time if I have questions, by calling [phone].    Additional written information will be given to me prior to start of therapy